# Patient Record
Sex: MALE | Race: WHITE | NOT HISPANIC OR LATINO | Employment: UNEMPLOYED | URBAN - METROPOLITAN AREA
[De-identification: names, ages, dates, MRNs, and addresses within clinical notes are randomized per-mention and may not be internally consistent; named-entity substitution may affect disease eponyms.]

---

## 2022-02-18 ENCOUNTER — HOSPITAL ENCOUNTER (INPATIENT)
Facility: HOSPITAL | Age: 57
LOS: 3 days | Discharge: HOME HEALTH | DRG: 024 | End: 2022-02-21
Attending: EMERGENCY MEDICINE | Admitting: PSYCHIATRY & NEUROLOGY
Payer: COMMERCIAL

## 2022-02-18 DIAGNOSIS — I48.91 ATRIAL FIBRILLATION, UNSPECIFIED TYPE: ICD-10-CM

## 2022-02-18 DIAGNOSIS — I63.411 CEREBROVASCULAR ACCIDENT (CVA) DUE TO EMBOLIC OCCLUSION OF RIGHT MIDDLE CEREBRAL ARTERY: Primary | ICD-10-CM

## 2022-02-18 DIAGNOSIS — I63.9 CVA (CEREBRAL VASCULAR ACCIDENT): ICD-10-CM

## 2022-02-18 DIAGNOSIS — I63.9 STROKE: ICD-10-CM

## 2022-02-18 PROBLEM — I48.0 PAROXYSMAL ATRIAL FIBRILLATION: Status: ACTIVE | Noted: 2022-02-18

## 2022-02-18 PROBLEM — I10 ESSENTIAL HYPERTENSION: Status: ACTIVE | Noted: 2022-02-18

## 2022-02-18 LAB
ALBUMIN SERPL BCP-MCNC: 3.4 G/DL (ref 3.5–5.2)
ALP SERPL-CCNC: 46 U/L (ref 55–135)
ALT SERPL W/O P-5'-P-CCNC: 29 U/L (ref 10–44)
ANION GAP SERPL CALC-SCNC: 10 MMOL/L (ref 8–16)
APTT BLDCRRT: 25.7 SEC (ref 21–32)
AST SERPL-CCNC: 23 U/L (ref 10–40)
BASOPHILS # BLD AUTO: 0.02 K/UL (ref 0–0.2)
BASOPHILS NFR BLD: 0.2 % (ref 0–1.9)
BILIRUB SERPL-MCNC: 0.7 MG/DL (ref 0.1–1)
BUN SERPL-MCNC: 16 MG/DL (ref 6–20)
BUN SERPL-MCNC: 18 MG/DL (ref 6–30)
CALCIUM SERPL-MCNC: 8.7 MG/DL (ref 8.7–10.5)
CHLORIDE SERPL-SCNC: 107 MMOL/L (ref 95–110)
CHLORIDE SERPL-SCNC: 109 MMOL/L (ref 95–110)
CHOLEST SERPL-MCNC: 135 MG/DL (ref 120–199)
CHOLEST/HDLC SERPL: 4.4 {RATIO} (ref 2–5)
CO2 SERPL-SCNC: 22 MMOL/L (ref 23–29)
CREAT SERPL-MCNC: 0.7 MG/DL (ref 0.5–1.4)
CREAT SERPL-MCNC: 0.7 MG/DL (ref 0.5–1.4)
CREAT SERPL-MCNC: 0.8 MG/DL (ref 0.5–1.4)
DIFFERENTIAL METHOD: ABNORMAL
EOSINOPHIL # BLD AUTO: 0.1 K/UL (ref 0–0.5)
EOSINOPHIL NFR BLD: 0.5 % (ref 0–8)
ERYTHROCYTE [DISTWIDTH] IN BLOOD BY AUTOMATED COUNT: 13 % (ref 11.5–14.5)
EST. GFR  (AFRICAN AMERICAN): >60 ML/MIN/1.73 M^2
EST. GFR  (NON AFRICAN AMERICAN): >60 ML/MIN/1.73 M^2
ESTIMATED AVG GLUCOSE: 97 MG/DL (ref 68–131)
GLUCOSE SERPL-MCNC: 89 MG/DL (ref 70–110)
GLUCOSE SERPL-MCNC: 92 MG/DL (ref 70–110)
HBA1C MFR BLD: 5 % (ref 4–5.6)
HCT VFR BLD AUTO: 45.4 % (ref 40–54)
HCT VFR BLD CALC: 43 %PCV (ref 36–54)
HDLC SERPL-MCNC: 31 MG/DL (ref 40–75)
HDLC SERPL: 23 % (ref 20–50)
HGB BLD-MCNC: 15.3 G/DL (ref 14–18)
IMM GRANULOCYTES # BLD AUTO: 0.04 K/UL (ref 0–0.04)
IMM GRANULOCYTES NFR BLD AUTO: 0.4 % (ref 0–0.5)
INR PPP: 1.1 (ref 0.8–1.2)
INR PPP: 1.1 (ref 0.8–1.2)
LDLC SERPL CALC-MCNC: 88.4 MG/DL (ref 63–159)
LYMPHOCYTES # BLD AUTO: 1.8 K/UL (ref 1–4.8)
LYMPHOCYTES NFR BLD: 16.6 % (ref 18–48)
MAGNESIUM SERPL-MCNC: 1.9 MG/DL (ref 1.6–2.6)
MCH RBC QN AUTO: 28.8 PG (ref 27–31)
MCHC RBC AUTO-ENTMCNC: 33.7 G/DL (ref 32–36)
MCV RBC AUTO: 86 FL (ref 82–98)
MONOCYTES # BLD AUTO: 1.1 K/UL (ref 0.3–1)
MONOCYTES NFR BLD: 10.2 % (ref 4–15)
NEUTROPHILS # BLD AUTO: 8 K/UL (ref 1.8–7.7)
NEUTROPHILS NFR BLD: 72.1 % (ref 38–73)
NONHDLC SERPL-MCNC: 104 MG/DL
NRBC BLD-RTO: 0 /100 WBC
PHOSPHATE SERPL-MCNC: 3.7 MG/DL (ref 2.7–4.5)
PLATELET # BLD AUTO: 192 K/UL (ref 150–450)
PMV BLD AUTO: 9.4 FL (ref 9.2–12.9)
POC IONIZED CALCIUM: 1.05 MMOL/L (ref 1.06–1.42)
POC PTINR: 1.2 (ref 0.9–1.2)
POC PTWBT: 14.8 SEC (ref 9.7–14.3)
POC TCO2 (MEASURED): 23 MMOL/L (ref 23–29)
POCT GLUCOSE: 97 MG/DL (ref 70–110)
POTASSIUM BLD-SCNC: 3.3 MMOL/L (ref 3.5–5.1)
POTASSIUM SERPL-SCNC: 3.5 MMOL/L (ref 3.5–5.1)
PROT SERPL-MCNC: 6.6 G/DL (ref 6–8.4)
PROTHROMBIN TIME: 11.5 SEC (ref 9–12.5)
PROTHROMBIN TIME: 11.6 SEC (ref 9–12.5)
RBC # BLD AUTO: 5.31 M/UL (ref 4.6–6.2)
SAMPLE: ABNORMAL
SAMPLE: ABNORMAL
SAMPLE: NORMAL
SODIUM BLD-SCNC: 142 MMOL/L (ref 136–145)
SODIUM SERPL-SCNC: 141 MMOL/L (ref 136–145)
TRIGL SERPL-MCNC: 78 MG/DL (ref 30–150)
TSH SERPL DL<=0.005 MIU/L-ACNC: 1.54 UIU/ML (ref 0.4–4)
WBC # BLD AUTO: 11.11 K/UL (ref 3.9–12.7)

## 2022-02-18 PROCEDURE — 25500020 PHARM REV CODE 255: Performed by: EMERGENCY MEDICINE

## 2022-02-18 PROCEDURE — 85025 COMPLETE CBC W/AUTO DIFF WBC: CPT | Performed by: EMERGENCY MEDICINE

## 2022-02-18 PROCEDURE — 99291 PR CRITICAL CARE, E/M 30-74 MINUTES: ICD-10-PCS | Mod: ,,, | Performed by: EMERGENCY MEDICINE

## 2022-02-18 PROCEDURE — 85610 PROTHROMBIN TIME: CPT | Performed by: EMERGENCY MEDICINE

## 2022-02-18 PROCEDURE — 84443 ASSAY THYROID STIM HORMONE: CPT | Performed by: EMERGENCY MEDICINE

## 2022-02-18 PROCEDURE — 99291 CRITICAL CARE FIRST HOUR: CPT | Mod: 25

## 2022-02-18 PROCEDURE — 99291 CRITICAL CARE FIRST HOUR: CPT | Mod: ,,, | Performed by: EMERGENCY MEDICINE

## 2022-02-18 PROCEDURE — 84100 ASSAY OF PHOSPHORUS: CPT

## 2022-02-18 PROCEDURE — 86901 BLOOD TYPING SEROLOGIC RH(D): CPT

## 2022-02-18 PROCEDURE — 25000003 PHARM REV CODE 250: Performed by: PSYCHIATRY & NEUROLOGY

## 2022-02-18 PROCEDURE — 63600175 PHARM REV CODE 636 W HCPCS: Mod: JG

## 2022-02-18 PROCEDURE — 83735 ASSAY OF MAGNESIUM: CPT

## 2022-02-18 PROCEDURE — 93010 EKG 12-LEAD: ICD-10-PCS | Mod: ,,, | Performed by: INTERNAL MEDICINE

## 2022-02-18 PROCEDURE — 93010 ELECTROCARDIOGRAM REPORT: CPT | Mod: ,,, | Performed by: INTERNAL MEDICINE

## 2022-02-18 PROCEDURE — 37195 THROMBOLYTIC THERAPY STROKE: CPT

## 2022-02-18 PROCEDURE — 85730 THROMBOPLASTIN TIME PARTIAL: CPT

## 2022-02-18 PROCEDURE — 80061 LIPID PANEL: CPT | Performed by: EMERGENCY MEDICINE

## 2022-02-18 PROCEDURE — 99900035 HC TECH TIME PER 15 MIN (STAT)

## 2022-02-18 PROCEDURE — 82565 ASSAY OF CREATININE: CPT

## 2022-02-18 PROCEDURE — 20000000 HC ICU ROOM

## 2022-02-18 PROCEDURE — 80053 COMPREHEN METABOLIC PANEL: CPT | Performed by: EMERGENCY MEDICINE

## 2022-02-18 PROCEDURE — 94761 N-INVAS EAR/PLS OXIMETRY MLT: CPT

## 2022-02-18 PROCEDURE — 85610 PROTHROMBIN TIME: CPT

## 2022-02-18 PROCEDURE — 83036 HEMOGLOBIN GLYCOSYLATED A1C: CPT

## 2022-02-18 PROCEDURE — 25000003 PHARM REV CODE 250

## 2022-02-18 PROCEDURE — 93005 ELECTROCARDIOGRAM TRACING: CPT

## 2022-02-18 PROCEDURE — 85610 PROTHROMBIN TIME: CPT | Mod: 91

## 2022-02-18 RX ORDER — AMOXICILLIN 250 MG
1 CAPSULE ORAL 2 TIMES DAILY
Status: DISCONTINUED | OUTPATIENT
Start: 2022-02-18 | End: 2022-02-21 | Stop reason: HOSPADM

## 2022-02-18 RX ORDER — NICARDIPINE HYDROCHLORIDE 0.2 MG/ML
0-15 INJECTION INTRAVENOUS CONTINUOUS
Status: DISCONTINUED | OUTPATIENT
Start: 2022-02-18 | End: 2022-02-19

## 2022-02-18 RX ORDER — ACETAMINOPHEN 325 MG/1
650 TABLET ORAL EVERY 6 HOURS PRN
Status: DISCONTINUED | OUTPATIENT
Start: 2022-02-18 | End: 2022-02-21 | Stop reason: HOSPADM

## 2022-02-18 RX ORDER — SODIUM CHLORIDE 0.9 % (FLUSH) 0.9 %
10 SYRINGE (ML) INJECTION
Status: DISCONTINUED | OUTPATIENT
Start: 2022-02-18 | End: 2022-02-21 | Stop reason: HOSPADM

## 2022-02-18 RX ORDER — SODIUM CHLORIDE 9 MG/ML
50 INJECTION, SOLUTION INTRAVENOUS ONCE
Status: DISCONTINUED | OUTPATIENT
Start: 2022-02-18 | End: 2022-02-21

## 2022-02-18 RX ORDER — MUPIROCIN 20 MG/G
OINTMENT TOPICAL 2 TIMES DAILY
Status: DISCONTINUED | OUTPATIENT
Start: 2022-02-18 | End: 2022-02-21 | Stop reason: HOSPADM

## 2022-02-18 RX ORDER — ATORVASTATIN CALCIUM 40 MG/1
40 TABLET, FILM COATED ORAL DAILY
Status: DISCONTINUED | OUTPATIENT
Start: 2022-02-19 | End: 2022-02-21 | Stop reason: HOSPADM

## 2022-02-18 RX ORDER — LABETALOL HCL 20 MG/4 ML
10 SYRINGE (ML) INTRAVENOUS
Status: DISCONTINUED | OUTPATIENT
Start: 2022-02-18 | End: 2022-02-21 | Stop reason: HOSPADM

## 2022-02-18 RX ORDER — ONDANSETRON 2 MG/ML
4 INJECTION INTRAMUSCULAR; INTRAVENOUS EVERY 8 HOURS PRN
Status: DISCONTINUED | OUTPATIENT
Start: 2022-02-18 | End: 2022-02-21 | Stop reason: HOSPADM

## 2022-02-18 RX ORDER — HYDRALAZINE HYDROCHLORIDE 20 MG/ML
10 INJECTION INTRAMUSCULAR; INTRAVENOUS EVERY 6 HOURS PRN
Status: DISCONTINUED | OUTPATIENT
Start: 2022-02-18 | End: 2022-02-21 | Stop reason: HOSPADM

## 2022-02-18 RX ADMIN — IOHEXOL 100 ML: 350 INJECTION, SOLUTION INTRAVENOUS at 06:02

## 2022-02-18 RX ADMIN — SENNOSIDES AND DOCUSATE SODIUM 1 TABLET: 50; 8.6 TABLET ORAL at 10:02

## 2022-02-18 RX ADMIN — MUPIROCIN: 20 OINTMENT TOPICAL at 10:02

## 2022-02-18 RX ADMIN — ALTEPLASE 81 MG: KIT at 06:02

## 2022-02-18 NOTE — ED PROVIDER NOTES
Encounter Date: 2/18/2022       History     Chief Complaint   Patient presents with    Extremity Weakness     LKN 1530     56-year-old male presenting with left-sided weakness.    PMH:  Hypertension, diagnosed with COVID-19 3 weeks ago    Context:  The patient's wife is at bedside reports he was last normal around 3:30 p.m. this afternoon, as they were texting.  When his wife returned home, she found him on the couch with slurred speech and not moving his left side.  The patient denies chest pain.  He states he had a headache earlier in the day today.  Onset:  Gradual  Location:  Left side  Duration:  Constant since onset this afternoon  Associated Symptoms:  Denies vomiting or head injury    The history is provided by the patient, medical records, the spouse and the EMS personnel. No  was used.     Review of patient's allergies indicates:  No Known Allergies  No past medical history on file.  No past surgical history on file.  No family history on file.     Review of Systems   Constitutional: Negative for fever.   HENT: Negative for facial swelling.    Eyes: Negative for visual disturbance.   Respiratory: Negative for shortness of breath.    Cardiovascular: Negative for chest pain.   Gastrointestinal: Negative for vomiting.   Skin: Negative for wound.   Allergic/Immunologic: Negative for immunocompromised state.   Neurological: Positive for facial asymmetry, speech difficulty, weakness and headaches.   Hematological: Does not bruise/bleed easily.       Physical Exam     Initial Vitals [02/18/22 1749]   BP Pulse Resp Temp SpO2   (!) 166/90 (!) 113 15 97.5 °F (36.4 °C) 95 %      MAP       --         Physical Exam    Nursing note and vitals reviewed.  Constitutional: He is not diaphoretic. No distress.   HENT:   Head: Normocephalic and atraumatic.   Eyes: Right eye exhibits no discharge. Left eye exhibits no discharge.   Neck: Neck supple. No tracheal deviation present.   Cardiovascular: Normal  rate and regular rhythm.   Pulmonary/Chest: Breath sounds normal. No respiratory distress.   Abdominal: Abdomen is soft. There is no abdominal tenderness.   Musculoskeletal:      Cervical back: Neck supple.     Neurological: He is alert and oriented to person, place, and time.   Slurred speech  Facial asymmetry  Right gaze deviation  Left arm and leg neglect  Full strength and sensation in right upper extremity and right lower extremity   Skin: Skin is warm. No rash noted.   Psychiatric: He has a normal mood and affect. His behavior is normal.         ED Course   Procedures  Labs Reviewed   CBC W/ AUTO DIFFERENTIAL - Abnormal; Notable for the following components:       Result Value    Gran # (ANC) 8.0 (*)     Mono # 1.1 (*)     Lymph % 16.6 (*)     All other components within normal limits   COMPREHENSIVE METABOLIC PANEL - Abnormal; Notable for the following components:    CO2 22 (*)     Albumin 3.4 (*)     Alkaline Phosphatase 46 (*)     All other components within normal limits   LIPID PANEL - Abnormal; Notable for the following components:    HDL 31 (*)     All other components within normal limits   ISTAT PROCEDURE - Abnormal; Notable for the following components:    POC Potassium 3.3 (*)     POC Ionized Calcium 1.05 (*)     All other components within normal limits   ISTAT PROCEDURE - Abnormal; Notable for the following components:    POC PTWBT 14.8 (*)     All other components within normal limits   PROTIME-INR   TSH   POCT GLUCOSE, HAND-HELD DEVICE   TYPE & SCREEN   ISTAT CREATININE   POCT GLUCOSE MONITORING CONTINUOUS     EKG Readings: (Independently Interpreted)   Initial Reading: No STEMI. Rhythm: Atrial Fibrillation. Heart Rate: 110. Clinical Impression: Atrial Fibrillation       Imaging Results           X-Ray Chest AP Single View (Final result)  Result time 02/18/22 21:26:16    Final result by Matt Bradford MD (02/18/22 21:26:16)                 Impression:      Findings suggestive of congestive  heart failure with developing pulmonary edema.    This report was flagged in Epic as abnormal.      Electronically signed by: Matt Bradford  Date:    02/18/2022  Time:    21:26             Narrative:    EXAMINATION:  XR CHEST 1 VIEW    CLINICAL HISTORY:  Admission;    TECHNIQUE:  Single frontal view of the chest was performed.    COMPARISON:  None    FINDINGS:  Multiple views of the chest indicates cardiomegaly with infiltrates involving the right left lung parenchyma.  No indication of a pulmonary nodule or pleural effusion.  No obvious evidence of free air under the diaphragm.                               IR Angiogram Carotid Cerebral Bilateral inc Arch (In process)                CTA STROKE MULTI-PHASE (Final result)  Result time 02/18/22 20:19:12    Final result by Josh Lozano MD (02/18/22 20:19:12)                 Impression:      Large vessel occlusion involving the proximal, mid, and distal aspect of the right M1 segment.  Reconstitution of flow is identified near the M1/M2 bifurcation with fair collateral circulation identified on subsequent phase imaging, likely secondary to communicating arteries and collateral vessels.    Complete occlusion of the right cervical internal carotid artery extending towards the petrous and cavernous segments.  Reconstitution of flow is identified near the carotid terminus, likely secondary to filling from anterior and posterior communicating arteries.    Subtle loss of gray-white differentiation involving the right basal ganglia, insular cortex, and temporal lobe, as well as hyperdense right MCA sign, in keeping with acute infarct involving these regions.  No evidence for acute hemorrhage.    Dominant left vertebral artery, noting diminutive character of the right vertebral artery without evidence for hemodynamically significant stenosis, aneurysmal dilatation, or dissection.    COMMUNICATION  This critical result was discovered/received at 19:20.  The critical information  above was relayed directly by Dr. Leonard by telephone to Jasper ALVAREZ with stroke team on 02/18/2022 at 19:30.    Electronically signed by resident: Von Leonard  Date:    02/18/2022  Time:    19:31    Electronically signed by: Josh Lozano MD  Date:    02/18/2022  Time:    20:19             Narrative:    EXAMINATION:  CTA STROKE MULTI-PHASE    CLINICAL HISTORY:  Neuro deficit, acute, stroke suspected;    TECHNIQUE:  Non contrast low dose axial images were obtained thought the head. CT angiogram was performed from the level of the anya to the top of the head following the IV administration of 100mL of Omnipaque 350.   Sagittal and coronal reconstructions and maximum intensity projection reconstructions were performed. Arterial stenosis percentages are based on NASCET measurement criteria.  Two additional phases of immediate post-contrast CTA images were performed through the head alone.    CT source data was analyzed using artificial intelligence software for detection of a large vessel occlusion (LVO) in order to enable computer-assisted triage notification and to aid clinical stroke decision making Rapid AI was used to detect hemorrhage.    COMPARISON:  No relevant prior imaging for comparison.    FINDINGS:  Intracranial Compartment:    Globes are symmetric, noting symmetric rightward gaze.    Ventricles and sulci are normal in size for age without evidence of hydrocephalus. No extra-axial blood or fluid collections.    On noncontrast imaging, there is tubular hyperdense signal in the expected course of the right MCA (axial series 2, image 16).  Additionally, there is subtle loss of gray-white differentiation involving the right basal ganglia, insular cortex, and temporal lobe compared to the left.    No parenchymal mass, hemorrhage, or edema.    Skull/Extracranial Contents (limited evaluation): No fracture. Mastoid air cells and paranasal sinuses are essentially clear.    Lung apices demonstrate no evidence for  consolidation, pleural effusion, or pneumothorax.    Few, prominent mediastinal lymph nodes, for example a right paratracheal lymph node measuring approximately 1.0 cm (axial series 304, image 50).    Soft tissues of the neck are unremarkable.    Aorta: Normal 3 vessel arch.    Extracranial carotid circulation: There is complete occlusion of the right cervical internal carotid artery just past the carotid bifurcation (axial series 304, image 250).  Filling defect includes the petrous and cavernous portions of the right internal carotid artery.  There is reconstitution of flow at the level of the supraclinoid right internal carotid artery extending towards the carotid terminus (axial series 304, image 411).  Flow at this point is likely secondary to anterior posterior communicating arteries.  Left carotid circulation demonstrates no hemodynamically significant stenosis, aneurysmal dilatation, or dissection.    Extracranial vertebral circulation: Dominant left vertebral artery.  Diminutive character of the right vertebral artery without evidence for hemodynamically significant stenosis, aneurysmal dilatation, or dissection.    Intracranial Arteries: There is a large vessel occlusion involving the proximal right MCA (axial series 305, image 112).  Filling defect extends approximately from the right proximal MCA towards the M1/M2 bifurcation.  Remainder of the right MCA branches fill appropriately, likely from communicating arteries and collateral vessels.  On subsequent phase imaging, there is adequate collateral vasculature in the right MCA distribution.  Left MCA, ACAs, and PCAs are unremarkable without evidence for aneurysm, dissection, critical stenosis, or occlusion.    Venous structures (limited evaluation): Normal.    Mild degenerative change of the visualized spine.  There are emphysematous changes in the apices.                                 Medications   0.9%  NaCl infusion (has no administration in time  range)   niCARdipine 40 mg/200 mL (0.2 mg/mL) infusion (0 mg/hr Intravenous Not Given 2/18/22 1945)   sodium chloride 0.9% flush 10 mL (has no administration in time range)   senna-docusate 8.6-50 mg per tablet 1 tablet (1 tablet Oral Given 2/18/22 2230)   ondansetron injection 4 mg (has no administration in time range)   atorvastatin tablet 40 mg (has no administration in time range)   labetalol 20 mg/4 mL (5 mg/mL) IV syring (has no administration in time range)   hydrALAZINE injection 10 mg (has no administration in time range)   acetaminophen tablet 650 mg (has no administration in time range)   sodium chloride 0.9% flush 10 mL (has no administration in time range)   iohexoL (OMNIPAQUE 350) injection 125 mL (has no administration in time range)   sodium chloride 0.9% flush 10 mL (has no administration in time range)   mupirocin 2 % ointment ( Nasal Given 2/18/22 2231)   iohexoL (OMNIPAQUE 350) injection 100 mL (100 mLs Intravenous Given 2/18/22 1801)   ALTEPLASE IV BOLUS FROM VIAL 9 mg (9 mg Intravenous Bolus from Bag 2/18/22 1831)   alteplase (ACtivase) injection 81 mg (81 mg Intravenous New Bag 2/18/22 1830)     Medical Decision Making:   History:   Old Medical Records: I decided to obtain old medical records.  Initial Assessment:   56-year-old male presenting with left-sided sahil-neglect.  Code stroke activated on arrival.  Protecting airway, following commands, no indication for emergent airway intervention.    Differential Diagnosis:   Including, but not limited to:  TIA/CVA  Thyroid dysfunction  Electrolyte dysfunction  Hypoglycemia  Doubt aortic pathology/intact and equal distal pulses, no chest pain  Independently Interpreted Test(s):   I have ordered and independently interpreted EKG Reading(s) - see prior notes  Clinical Tests:   Lab Tests: Ordered and Reviewed  Radiological Study: Reviewed and Ordered  Medical Tests: Ordered and Reviewed  ED Management:  CTA consistent with LVO involving right M1  segment.  Complete occlusion of the right cervical internal carotid artery.   I discussed the case with Vascular Neurology, who ordered the patient for tPA.  Blood pressure controlled in the ED.  Also found to be in Afib.  Patient taken emergently for thrombectomy.  Other:   I have discussed this case with another health care provider.            Attending Attestation:         Attending Critical Care:   Critical Care Times:   Direct Patient Care (initial evaluation, reassessments, and time considering the case)................................................................14 minutes.   Additional History from reviewing old medical records or taking additional history from the family, EMS, PCP, etc.......................5 minutes.   Ordering, Reviewing, and Interpreting Diagnostic Studies...............................................................................................................6 minutes.   Documentation..................................................................................................................................................................................4 minutes.   Consultation with other Physicians. .................................................................................................................................................6 minutes.   ==============================================================  · Total Critical Care Time - exclusive of procedural time: 35 minutes.  ==============================================================  Critical care was necessary to treat or prevent imminent or life-threatening deterioration of the following conditions: stroke.   Critical care was time spent personally by me on the following activities: obtaining history from patient or relative, examination of patient, review of old charts, ordering lab, x-rays, and/or EKG, ordering and performing treatments and interventions, discussion with consultants and re-evaluation  of patient's conition.   Critical Care Condition: potentially life-threatening                    Clinical Impression:   Final diagnoses:  [I63.9] Stroke  [I48.91] Atrial fibrillation, unspecified type (Primary)          ED Disposition Condition    Admit               Andrea Cardoza MD  02/18/22 6906

## 2022-02-19 LAB
ABO + RH BLD: NORMAL
ALBUMIN SERPL BCP-MCNC: 3.6 G/DL (ref 3.5–5.2)
ALP SERPL-CCNC: 51 U/L (ref 55–135)
ALT SERPL W/O P-5'-P-CCNC: 25 U/L (ref 10–44)
ANION GAP SERPL CALC-SCNC: 10 MMOL/L (ref 8–16)
ASCENDING AORTA: 3.12 CM
AST SERPL-CCNC: 20 U/L (ref 10–40)
AV INDEX (PROSTH): 0.88
AV MEAN GRADIENT: 2 MMHG
AV PEAK GRADIENT: 4 MMHG
AV VALVE AREA: 3.2 CM2
AV VELOCITY RATIO: 0.89
BASOPHILS # BLD AUTO: 0.03 K/UL (ref 0–0.2)
BASOPHILS NFR BLD: 0.3 % (ref 0–1.9)
BILIRUB SERPL-MCNC: 0.9 MG/DL (ref 0.1–1)
BLD GP AB SCN CELLS X3 SERPL QL: NORMAL
BSA FOR ECHO PROCEDURE: 2.36 M2
BUN SERPL-MCNC: 14 MG/DL (ref 6–20)
CALCIUM SERPL-MCNC: 9.2 MG/DL (ref 8.7–10.5)
CHLORIDE SERPL-SCNC: 102 MMOL/L (ref 95–110)
CO2 SERPL-SCNC: 25 MMOL/L (ref 23–29)
CREAT SERPL-MCNC: 0.7 MG/DL (ref 0.5–1.4)
CV ECHO LV RWT: 0.38 CM
DIFFERENTIAL METHOD: ABNORMAL
DOP CALC AO PEAK VEL: 0.94 M/S
DOP CALC AO VTI: 16.32 CM
DOP CALC LVOT AREA: 3.6 CM2
DOP CALC LVOT DIAMETER: 2.15 CM
DOP CALC LVOT PEAK VEL: 0.84 M/S
DOP CALC LVOT STROKE VOLUME: 52.25 CM3
DOP CALCLVOT PEAK VEL VTI: 14.4 CM
E/E' RATIO: 8.26 M/S
ECHO LV POSTERIOR WALL: 0.9 CM (ref 0.6–1.1)
EJECTION FRACTION: 55 %
EOSINOPHIL # BLD AUTO: 0 K/UL (ref 0–0.5)
EOSINOPHIL NFR BLD: 0.3 % (ref 0–8)
ERYTHROCYTE [DISTWIDTH] IN BLOOD BY AUTOMATED COUNT: 13.2 % (ref 11.5–14.5)
EST. GFR  (AFRICAN AMERICAN): >60 ML/MIN/1.73 M^2
EST. GFR  (NON AFRICAN AMERICAN): >60 ML/MIN/1.73 M^2
FRACTIONAL SHORTENING: 36 % (ref 28–44)
GLUCOSE SERPL-MCNC: 92 MG/DL (ref 70–110)
HCT VFR BLD AUTO: 46.1 % (ref 40–54)
HGB BLD-MCNC: 15.5 G/DL (ref 14–18)
IMM GRANULOCYTES # BLD AUTO: 0.02 K/UL (ref 0–0.04)
IMM GRANULOCYTES NFR BLD AUTO: 0.2 % (ref 0–0.5)
INTERVENTRICULAR SEPTUM: 0.94 CM (ref 0.6–1.1)
IVRT: 105.61 MSEC
LA MAJOR: 6.64 CM
LA MINOR: 7.03 CM
LA WIDTH: 3.74 CM
LEFT ATRIUM SIZE: 4.47 CM
LEFT ATRIUM VOLUME INDEX MOD: 48.2 ML/M2
LEFT ATRIUM VOLUME INDEX: 42.6 ML/M2
LEFT ATRIUM VOLUME MOD: 110 CM3
LEFT ATRIUM VOLUME: 97.05 CM3
LEFT INTERNAL DIMENSION IN SYSTOLE: 3.06 CM (ref 2.1–4)
LEFT VENTRICLE DIASTOLIC VOLUME INDEX: 46.36 ML/M2
LEFT VENTRICLE DIASTOLIC VOLUME: 105.7 ML
LEFT VENTRICLE MASS INDEX: 66 G/M2
LEFT VENTRICLE SYSTOLIC VOLUME INDEX: 16.1 ML/M2
LEFT VENTRICLE SYSTOLIC VOLUME: 36.66 ML
LEFT VENTRICULAR INTERNAL DIMENSION IN DIASTOLE: 4.77 CM (ref 3.5–6)
LEFT VENTRICULAR MASS: 150.59 G
LV LATERAL E/E' RATIO: 8.64 M/S
LV SEPTAL E/E' RATIO: 7.92 M/S
LYMPHOCYTES # BLD AUTO: 1.7 K/UL (ref 1–4.8)
LYMPHOCYTES NFR BLD: 17.8 % (ref 18–48)
MAGNESIUM SERPL-MCNC: 1.9 MG/DL (ref 1.6–2.6)
MCH RBC QN AUTO: 29.1 PG (ref 27–31)
MCHC RBC AUTO-ENTMCNC: 33.6 G/DL (ref 32–36)
MCV RBC AUTO: 87 FL (ref 82–98)
MONOCYTES # BLD AUTO: 1.1 K/UL (ref 0.3–1)
MONOCYTES NFR BLD: 10.9 % (ref 4–15)
MV PEAK E VEL: 0.95 M/S
NEUTROPHILS # BLD AUTO: 6.8 K/UL (ref 1.8–7.7)
NEUTROPHILS NFR BLD: 70.5 % (ref 38–73)
NRBC BLD-RTO: 0 /100 WBC
PHOSPHATE SERPL-MCNC: 4 MG/DL (ref 2.7–4.5)
PLATELET # BLD AUTO: 183 K/UL (ref 150–450)
PMV BLD AUTO: 9.4 FL (ref 9.2–12.9)
POCT GLUCOSE: 83 MG/DL (ref 70–110)
POCT GLUCOSE: 85 MG/DL (ref 70–110)
POCT GLUCOSE: 87 MG/DL (ref 70–110)
POCT GLUCOSE: 91 MG/DL (ref 70–110)
POTASSIUM SERPL-SCNC: 3.9 MMOL/L (ref 3.5–5.1)
PROT SERPL-MCNC: 6.7 G/DL (ref 6–8.4)
RA MAJOR: 5.04 CM
RA PRESSURE: 3 MMHG
RA WIDTH: 3.38 CM
RBC # BLD AUTO: 5.33 M/UL (ref 4.6–6.2)
RIGHT VENTRICULAR END-DIASTOLIC DIMENSION: 3.2 CM
RV TISSUE DOPPLER FREE WALL SYSTOLIC VELOCITY 1 (APICAL 4 CHAMBER VIEW): 13.1 CM/S
SINUS: 3.05 CM
SODIUM SERPL-SCNC: 137 MMOL/L (ref 136–145)
STJ: 2.55 CM
TDI LATERAL: 0.11 M/S
TDI SEPTAL: 0.12 M/S
TDI: 0.12 M/S
TRICUSPID ANNULAR PLANE SYSTOLIC EXCURSION: 1.94 CM
WBC # BLD AUTO: 9.67 K/UL (ref 3.9–12.7)

## 2022-02-19 PROCEDURE — 83735 ASSAY OF MAGNESIUM: CPT

## 2022-02-19 PROCEDURE — 84100 ASSAY OF PHOSPHORUS: CPT

## 2022-02-19 PROCEDURE — 25000003 PHARM REV CODE 250: Performed by: PSYCHIATRY & NEUROLOGY

## 2022-02-19 PROCEDURE — 99223 PR INITIAL HOSPITAL CARE,LEVL III: ICD-10-PCS | Mod: ,,, | Performed by: PSYCHIATRY & NEUROLOGY

## 2022-02-19 PROCEDURE — 99233 PR SUBSEQUENT HOSPITAL CARE,LEVL III: ICD-10-PCS | Mod: ,,, | Performed by: PSYCHIATRY & NEUROLOGY

## 2022-02-19 PROCEDURE — 97161 PT EVAL LOW COMPLEX 20 MIN: CPT

## 2022-02-19 PROCEDURE — 25000003 PHARM REV CODE 250: Performed by: NURSE PRACTITIONER

## 2022-02-19 PROCEDURE — 99223 1ST HOSP IP/OBS HIGH 75: CPT | Mod: ,,, | Performed by: PSYCHIATRY & NEUROLOGY

## 2022-02-19 PROCEDURE — 80053 COMPREHEN METABOLIC PANEL: CPT

## 2022-02-19 PROCEDURE — 99233 SBSQ HOSP IP/OBS HIGH 50: CPT | Mod: ,,, | Performed by: PSYCHIATRY & NEUROLOGY

## 2022-02-19 PROCEDURE — 85025 COMPLETE CBC W/AUTO DIFF WBC: CPT

## 2022-02-19 PROCEDURE — 94761 N-INVAS EAR/PLS OXIMETRY MLT: CPT

## 2022-02-19 PROCEDURE — 92610 EVALUATE SWALLOWING FUNCTION: CPT

## 2022-02-19 PROCEDURE — 25000003 PHARM REV CODE 250

## 2022-02-19 PROCEDURE — 97535 SELF CARE MNGMENT TRAINING: CPT

## 2022-02-19 PROCEDURE — 63600175 PHARM REV CODE 636 W HCPCS: Performed by: NURSE PRACTITIONER

## 2022-02-19 PROCEDURE — 20000000 HC ICU ROOM

## 2022-02-19 PROCEDURE — 97165 OT EVAL LOW COMPLEX 30 MIN: CPT

## 2022-02-19 PROCEDURE — 97530 THERAPEUTIC ACTIVITIES: CPT

## 2022-02-19 RX ORDER — HEPARIN SODIUM 5000 [USP'U]/ML
5000 INJECTION, SOLUTION INTRAVENOUS; SUBCUTANEOUS EVERY 8 HOURS
Status: DISCONTINUED | OUTPATIENT
Start: 2022-02-19 | End: 2022-02-19

## 2022-02-19 RX ORDER — HEPARIN SODIUM 5000 [USP'U]/ML
5000 INJECTION, SOLUTION INTRAVENOUS; SUBCUTANEOUS EVERY 8 HOURS
Status: COMPLETED | OUTPATIENT
Start: 2022-02-19 | End: 2022-02-20

## 2022-02-19 RX ORDER — NAPROXEN SODIUM 220 MG/1
81 TABLET, FILM COATED ORAL DAILY
Status: DISCONTINUED | OUTPATIENT
Start: 2022-02-19 | End: 2022-02-21 | Stop reason: HOSPADM

## 2022-02-19 RX ADMIN — ATORVASTATIN CALCIUM 40 MG: 40 TABLET, FILM COATED ORAL at 08:02

## 2022-02-19 RX ADMIN — MUPIROCIN: 20 OINTMENT TOPICAL at 08:02

## 2022-02-19 RX ADMIN — ASPIRIN 81 MG CHEWABLE TABLET 81 MG: 81 TABLET CHEWABLE at 09:02

## 2022-02-19 RX ADMIN — SENNOSIDES AND DOCUSATE SODIUM 1 TABLET: 50; 8.6 TABLET ORAL at 08:02

## 2022-02-19 RX ADMIN — HEPARIN SODIUM 5000 UNITS: 5000 INJECTION INTRAVENOUS; SUBCUTANEOUS at 08:02

## 2022-02-19 NOTE — ED TRIAGE NOTES
Pt presents to ER from home for neurological evaluation. Pt's LKW at 1530 today. Pt then developed left sided weakness, slurred speech, and left facial droop.

## 2022-02-19 NOTE — CONSULTS
[facility] - Emergency Dept  Vascular Neurology  [facility]  Consult Note    Inpatient consult to Vascular (Stroke) Neurology  Consult performed by: Brad Hutchinson MD  Consult ordered by: Roopa Meza PA-C    Inpatient consult to Vascular (Stroke) Neurology  Consult performed by: Brad Hutchinson MD  Consult ordered by: Andrea Cardoza MD        Assessment/Plan:     Patient is a 56 y.o. year old male with:    * Stroke due to embolism of right middle cerebral artery  Andry Cantu is a 56 y.o. male who presented to ED with LSW, L facial droop, and dysarthria. Stroke code called on ED arrival. Patient LKN at 1530. CT head with hyperdense R MCA vessel sign and no early ischemic changes. Patient was treated with alteplase. CTA multiphase with tandem R carotid and  R M1 occlusions. Patient going to IR for possible thrombectomy. Patient to be admitted to [facility] following IR.     Stroke etiology likely cardioembolic 2/2 atrial fibrillation    Antithrombotics for secondary stroke prevention: Antiplatelets: None: Hold all Antithrombotics x 24 hours after IV t-PA administration    Statins for secondary stroke prevention and hyperlipidemia, if present:   Statins: Atorvastatin- 40 mg daily    Aggressive risk factor modification: None     Rehab efforts: The patient has been evaluated by a stroke team provider and the therapy needs have been fully considered based off the presenting complaints and exam findings. The following therapy evaluations are needed: PT evaluate and treat, OT evaluate and treat, SLP evaluate and treat, PM&R evaluate for appropriate placement    Diagnostics ordered/pending: HgbA1C to assess blood glucose levels, Lipid Profile to assess cholesterol levels, MRI head without contrast to assess brain parenchyma, TTE to assess cardiac function/status , TSH to assess thyroid function    VTE prophylaxis: Mechanical prophylaxis: Place SCDs  None: Reason for No Pharmacological VTE Prophylaxis:  Holding x 24 hours s/p treatment with alteplase (tPA)    BP parameters: Infarct: Post tPA, SBP <180        Essential hypertension  Noted per chart review.    Paroxysmal atrial fibrillation  Stroke risk factor. Noted on telemetry in ED. No prior history.      STROKE DOCUMENTATION     Acute Stroke Times   Last Known Normal Date: 02/18/22  Last Known Normal Time: 1521  Symptom Onset Date: 02/18/22  Symptom Onset Time: 1530  Stroke Team Called Date: 02/18/22  Stroke Team Called Time: 1757  Stroke Team Arrival Date: 02/18/22  Stroke Team Arrival Time: 1800  CT Interpretation Time: 1813  Alteplase Recommended: Yes  CTA Interpretation Time: 1820  Thrombectomy Recommended: Yes  Decision to Treat Time for Alteplase: 1823  Decision to Treat Time for IR: 1847    NIH Scale:  Interval: initial 15 minute assessment from arrival  1a. Level of Consciousness: 1-->Not alert, but arousable by minor stimulation to obey, answer, or respond  1b. LOC Questions: 0-->Answers both questions correctly  1c. LOC Commands: 1-->Performs one task correctly  2. Best Gaze: 2-->Forced deviation, or total gaze paresis not overcome by the oculocephalic maneuver  3. Visual: 0-->No visual loss  4. Facial Palsy: 2-->Partial paralysis (total or near-total paralysis of lower face)  5a. Motor Arm, Left: 3-->No effort against gravity, limb falls  5b. Motor Arm, Right: 0-->No drift, limb holds 90 (or 45) degrees for full 10 secs  6a. Motor Leg, Left: 3-->No effort against gravity, leg falls to bed immediately  6b. Motor Leg, Right: 0-->No drift, leg holds 30 degree position for full 5 secs  7. Limb Ataxia: 1-->Present in one limb  8. Sensory: 1-->Mild-to-moderate sensory loss, patient feels pinprick is less sharp or is dull on the affected side, or there is a loss of superficial pain with pinprick, but patient is aware of being touched  9. Best Language: 1-->Mild-to-moderate aphasia, some obvious loss of fluency or facility of comprehension, without  significant limitation on ideas expressed or form of expression. Reduction of speech and/or comprehension, however, makes conversation. . . (see row details)  10. Dysarthria: 1-->Mild-to-moderate dysarthria, patient slurs at least some words and, at worst, can be understood with some difficulty  11. Extinction and Inattention (formerly Neglect): 1-->Visual, tactile, auditory, spatial, or personal inattention or extinction to bilateral simultaneous stimulation in one of the sensory modalities  Total (NIH Stroke Scale): 17    Modified Gilliam Score: 0  Denisse Coma Scale:14   ABCD2 Score:    [id number] Score:3  HAS -BLED Score:   ICH Score:   Hunt & Tsai Classification:       Thrombolysis Candidate? Yes. The risks and benefits of tPA were discussed with the patient and/or family. The patient and/or family verbalized understanding of the risks arid benefits and has given verbal consent for tPA, If patient was not competent or no family was available, treatment will be administered as an emergency procedure and in what we believe to be the patients best interest    Delays to Thrombolysis?  No    Interventional Revascularization Candidate?   Is the patient eligible for mechanical endovascular reperfusion (GIOVANA)?  Yes    Delays to Thrombectomy? No    Hemorrhagic change of an Ischemic Stroke: Does this patient have an ischemic stroke with hemorrhagic changes? No     Subjective:     History of Present Illness:  Andry Cantu is a 56 y.o. male with PMHx of HTN and new onset Afib (on telemetry) who presented to ED from home with LSW, dysarthria, and L facial droop. History obtained via patients wife and EMR.    Last known normal 02/18/2022 at 15:30. Wife phoned patient at 15:21 while driving home and noted the patient was speaking normally over telephone. When she arrived at 15:30 she notes the patient was dysarthric and not moving his extremities on the left. Wife phoned EMS and patient was transferred to [facility] ED. On transfer  patient was hypertensive (160/90s) with blood glucose in the 90's. On arrival stroke code was activated and patient was rushed to CT. CTA stroke multiphase notable for hyperdense right MCA and right MCA M1 occlusion. Of note, patient recently diagnosed with COVID 3 weeks prior.           No past medical history on file.  No past surgical history on file.  No family history on file.     Review of patient's allergies indicates:  No Known Allergies    Medications: I have reviewed the current medication administration record.    (Not in a hospital admission)      Review of Systems   Constitutional: Negative for chills and fever.   HENT: Negative for drooling.    Eyes: Negative for visual disturbance.   Respiratory: Negative for cough.    Cardiovascular: Negative for chest pain.   Gastrointestinal: Negative for nausea and vomiting.   Genitourinary: Negative for dysuria.   Musculoskeletal: Negative for arthralgias, back pain, myalgias and neck pain.   Skin: Negative for rash.   Neurological: Positive for facial asymmetry, speech difficulty and weakness. Negative for numbness.   Psychiatric/Behavioral: Negative for confusion.     Objective:     Vital Signs (Most Recent):  Temp: 97.5 °F (36.4 °C) (02/18/22 1749)  Pulse: (!) 113 (02/18/22 1749)  Resp: 15 (02/18/22 1749)  BP: (!) 166/90 (02/18/22 1749)  SpO2: 95 % (02/18/22 1749)    Vital Signs Range (Last 24H):  Temp:  [97.5 °F (36.4 °C)]   Pulse:  [113]   Resp:  [15]   BP: (166)/(90)   SpO2:  [95 %]     Physical Exam  Vitals reviewed.   Constitutional:       General: He is not in acute distress.     Appearance: He is well-developed and well-nourished.   HENT:      Head: Normocephalic and atraumatic.      Nose: Nose normal.   Eyes:      General: No scleral icterus.  Cardiovascular:      Rate and Rhythm: Normal rate.   Pulmonary:      Effort: Pulmonary effort is normal. No respiratory distress.   Abdominal:      General: There is no distension.   Musculoskeletal:       Cervical back: Normal range of motion.      Right lower leg: No edema.      Left lower leg: No edema.   Skin:     General: Skin is warm and dry.   Neurological:      Mental Status: He is alert.          Neurological Exam:   LOC: drowsy  Attention Span: poor  Language: No aphasia  Articulation: Dysarthria  Orientation: Not oriented to place, Not oriented to time  Visual Fields: Visual neglect  EOM (CN III, IV, VI): Gaze preference  right  Pupils (CN II, III): PERRL  Facial Sensation (CN V): Normal  Facial Movement (CN VII): Lower facial weakness on the Left  Motor: Arm left  Plegia 0/5  Leg left  Plegia 0/5  Arm right  Normal 5/5  Leg right Normal 5/5  Sensation: Edy-hypoesthesia left      Laboratory:  CMP:   Recent Labs   Lab 02/18/22  1820   CALCIUM 8.7   ALBUMIN 3.4*   PROT 6.6      K 3.5   CO2 22*      BUN 16   CREATININE 0.7   ALKPHOS 46*   ALT 29   AST 23   BILITOT 0.7     CBC:   Recent Labs   Lab 02/18/22  1755   HCT 43     Lipid Panel:   Recent Labs   Lab 02/18/22  1820   CHOL 135   LDLCALC 88.4   HDL 31*   TRIG 78     Coagulation:   Recent Labs   Lab 02/18/22  1820   INR 1.1     Hgb A1C: No results for input(s): HGBA1C in the last 168 hours.  TSH:   Recent Labs   Lab 02/18/22  1820   TSH 1.540       Diagnostic Results:      Brain imaging:  MRI pending    Vessel Imaging:  CTA Multiphase. Date: 02/18/22  No hemorrhage  No early ischemic changes  R carotid/R M1 tandem occlusion    Cardiac Evaluation:   TTE pending      Brad Hutchinson MD  [facility]  Department of Vascular Neurology   [facility] - Emergency Dept

## 2022-02-19 NOTE — NURSING
[facility] Care Plan    POC reviewed with Andry Cantu and family at 0300. Pt verbalized understanding. Questions and concerns addressed. No acute events overnight. Pt progressing toward goals. Will continue to monitor. See below and flowsheets for full assessment and VS info.       Neuro:  Denisse Coma Scale  Best Eye Response: 3-->(E3) to speech  Best Motor Response: 6-->(M6) obeys commands  Best Verbal Response: 4-->(V4) confused  Denisse Coma Scale Score: 13        24hr Temp:  [97.5 °F (36.4 °C)-97.9 °F (36.6 °C)]     CV:   Rhythm: normal sinus rhythm  BP goals:   SBP < 160  MAP > 65    Resp:   O2 Device (Oxygen Therapy): room air       Plan: Monitor for neuro changes and bleeding post tpa/thrombectomy    GI/:     Diet/Nutrition Received: NPO  Last Bowel Movement: 02/18/22       Intake/Output Summary (Last 24 hours) at 2/19/2022 0633  Last data filed at 2/19/2022 0500  Gross per 24 hour   Intake 460 ml   Output 700 ml   Net -240 ml          Labs/Accuchecks:  Recent Labs   Lab 02/19/22  0016   WBC 9.67   RBC 5.33   HGB 15.5   HCT 46.1         Recent Labs   Lab 02/19/22  0016      K 3.9   CO2 25      BUN 14   CREATININE 0.7   ALKPHOS 51*   ALT 25   AST 20   BILITOT 0.9      Recent Labs   Lab 02/18/22  2238   INR 1.1   APTT 25.7    No results for input(s): CPK, CPKMB, TROPONINI, MB in the last 168 hours.    Electrolytes: N/A - electrolytes WDL  Accuchecks: Q6H    Gtts:   nicardipine         LDA/Wounds:  Lines/Drains/Airways     Peripheral Intravenous Line  Duration                Peripheral IV - Single Lumen 02/18/22 1800 18 G Right Antecubital <1 day         Peripheral IV - Single Lumen 02/19/22 0000 Left;Posterior Hand <1 day              Wounds: No  Wound care consulted: No

## 2022-02-19 NOTE — ASSESSMENT & PLAN NOTE
56M, MYRON on 2/18/22 at 3:30 PM, presented with R MCA stroke. Now s/p thrombectomy with TICI 2c after 2 passes. Presenting NIH 13.    - Admit to NCC  - Q1h neuro checks  - SBP < 160  - CBC, CMP  - SLP, PT, OT eval  - TSH, A1c  - TTE  No large artery stenosis on CTA

## 2022-02-19 NOTE — PLAN OF CARE
Problem: SLP Goal  Goal: SLP Goal  Description: Speech Pathology Goals  To be met by 2/5/22    1. Pt will exhibit a functional swallow for tolerance of a regular diet with thin liquids in order to maintain adequate hydration and nutrition  2. Pt will undergo formal speech and language evaluation to determine presence/severity of speech, language, and/or cognitive linguistic impairment         Outcome: Ongoing, Progressing   Clinical swallow evaluation completed. Recommend a regular diet with thin liquids and meds whole 1 at a time. Full speech/language assessment to be completed in the future. SLP to follow.

## 2022-02-19 NOTE — HPI
Andry Cantu is a 56 y.o. male with PMHx of HTN and new onset Afib (on telemetry) who presented to ED from home with LSW, dysarthria, and L facial droop. History obtained via patients wife and EMR.    Last known normal 02/18/2022 at 15:30. Wife phoned patient at 15:21 while driving home and noted the patient was speaking normally over telephone. When she arrived at 15:30 she notes the patient was dysarthric and not moving his extremities on the left. Wife phoned EMS and [place] [facility] [place]. On transfer patient was hypertensive (160/90s) with blood glucose in the 90's. On arrival stroke code was activated and patient was rushed to CT. CTA stroke multiphase notable for hyperdense right MCA and right MCA M1 occlusion. Of note, patient recently diagnosed with COVID 3 weeks prior.

## 2022-02-19 NOTE — ASSESSMENT & PLAN NOTE
Andry Cantu is a 56 y.o. male who presented to ED with LSW, L facial droop, and dysarthria. CT head with hyperdense R MCA vessel sign and no early ischemic changes. Patient was treated with alteplase. CTA multiphase with tandem R carotid and  R M1 occlusions. Patient received tpa and then went for thrombectomy, TICI 2b. Patient was admitted to [facility]. TTE showed 55% EF, severe LAE, and normal function. Patient was noted to be in AF in ED and remains in AF in NCC. MRI pending. Therapy recommendations pending.     Etiology: Cardioembolic (new onset A Fib)     Antithrombotics for secondary stroke prevention: Antiplatelets: None: Hold all Antithrombotics x 24 hours after IV t-PA administration ---Will need AC in the future, pending MRI to determine when to start AC       Statins for secondary stroke prevention and hyperlipidemia, if present:   Statins: Atorvastatin- 40 mg daily    Aggressive risk factor modification: None     Rehab efforts: The patient has been evaluated by a stroke team provider and the therapy needs have been fully considered based off the presenting complaints and exam findings. The following therapy evaluations are needed: PT evaluate and treat, OT evaluate and treat, SLP evaluate and treat, PM&R evaluate for appropriate placement--pending recommendations; Regular/Thin diet    Diagnostics ordered/pending: MRI head without contrast to assess brain parenchyma    VTE prophylaxis: Mechanical prophylaxis: Place SCDs  None: Reason for No Pharmacological VTE Prophylaxis: Holding x 24 hours s/p treatment with alteplase (tPA)--will need heparin 5000 units q8h for VTE prophylaxis when appropriate    BP parameters: Infarct: Post sucessful thrombectomy, SBP <140

## 2022-02-19 NOTE — PROCEDURES
Radiology Post-Procedure Note    Pre Op Diagnosis: Stroke    Post Op Diagnosis: Stroke    Procedure: Cerebral angiogram and aspiration thrombectomy    Procedure performed by: Shawn Coley MD    Written Informed Consent Obtained: Yes    Specimen Removed: NO    Estimated Blood Loss: less than 50     Procedure report:     An 8F sheath was placed into the right femoral artery and a 5F Jimenez catheter was advanced into the aortic arch.  The right CCA, ICA and MCA were subselected and angiography of the brain was performed after injection into each of these vessels.    Preliminary interpretation: Initial angiogram showed right cervical ICA occlusion. Aspiration was performed in the cervical ICA with large amount of thrombus aspirated. There was not evidence of an underlying stenosis. There was mild spasm around the catheter which improved on subsequent angiograms. Following aspiration there was restoration of flow in the ICA and TICI 2 B flow in the right MCA territory on the first pass.. There was a distal M2 occlusion which was aspirated on the second pass with 2C flow. Please see Imaging report for full details.    A right femoral artery angiogram was performed, the sheath removed and hemostasis achieved using perclose device.  No hematoma was present at the time of hemostasis.    Systolic blood pressure goals less than 160.    The patient tolerated the procedure well.     Shawn Coley MD  Department of Radiology  Pager: [phone]

## 2022-02-19 NOTE — PT/OT/SLP EVAL
"Physical Therapy Co-Evaluation and Co-Treatment    Patient Name:  Andry Cantu   MRN:  [id number]    Recommendations:     Discharge Recommendations:  home health PT   Discharge Equipment Recommendations: none   Barriers to discharge: None and some unsteadiness at this time    Assessment:     Andry Cantu is a 56 y.o. male admitted with a medical diagnosis of Stroke due to embolism of right middle cerebral artery.  He presents with the following impairments/functional limitations:  impaired endurance, impaired self care skills, impaired functional mobilty, gait instability, weakness, impaired balance, decreased lower extremity function. These deficits affect their roles and responsibilities in which they were able to complete prior to admit. Andry Cantu would benefit from acute PT intervention to improve quality of life and focus on recovery of impairments. Once medically stable, recommending pt discharge to Home Health PT.     Rehab Prognosis: Good; patient would benefit from acute skilled PT services 3 x/week to address these deficits and reach maximum level of function. Patient is most appropriate to go to home health PT.  Recent Surgery: * No surgery found *      Plan:     During this hospitalization, patient to be seen 3 x/week to address the identified rehab impairments via gait training, therapeutic exercises, therapeutic activities, neuromuscular re-education and progress toward the following goals:    · Plan of Care Expires:  03/21/22    Subjective     Chief Complaint: none verbalized  Patient/Family Comments/Goals: pt reports he feels "good"  Pain/Comfort:  · Pain Rating 1: 0/10  · Pain Rating Post-Intervention 1: 0/10    Patients cultural, spiritual, Sikh conflicts given the current situation: no    Living Environment/Occupational Profile:  Living Environment: Pt lives with wife in a [facility] with 2 NADEEM without HRs as well as a ramp entry to the back door. Pt's bathroom has a WIS.  Prior Level of Function: " Patient reports being independent with mobility & with ADLs.   Patient uses DME as follows: none.   DME owned (not currently used): none.  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   Drive: yes.   Managing Medicines/Managing Home: yes.   Equipment Used at Home:  none     Patient reports they will have assistance from wife (who works) upon discharge.    Objective:     Communicated with nursing prior to session.  Patient found HOB elevated with blood pressure cuff, peripheral IV, pulse ox (continuous), telemetry, SCD upon PT entry to room.    General Precautions: Standard, fall, aspiration   Orthopedic Precautions:N/A   Braces: N/A    Exams:  · Cognitive Exam: Patient is oriented to Person, Place, Time, Situation  · RLE ROM: WFL  · RLE Strength: WFL, grossly 4 to 5/5  · LLE ROM: WFL  · LLE Strength: WFL, grossly 4 to 5/5  · Sensation: Intact light touch to BLEs, denies numbness/tingling  · Coordination: Heel to shin WFL bilaterally     Functional Mobility:  · Bed Mobility:     · Scooting: stand by assistance  · Supine to Sit: stand by assistance  · Sit to Supine: stand by assistance  · Transfers:     · Sit to Stand:  contact guard assistance with no AD  · Gait: 15 ft x 2 trials, CGA-SBA, no AD,  standing break to urinate with OT assistance in standing, 1 minor LOB during standing  · Stiff and slightly unsteady gait noted  · Balance:   · Static Sitting: SBA  · Dynamic Sitting:  SBA  · Static Standing: SBA-CGA, 1 LOB while standing an urinating with OT assistance  · Dynamic Standing: SBA-CGA    Therapeutic Activities and Exercises:  Patient educated on role of acute care PT and PT POC, safety while in hospital including calling nurse for mobility and call light usage  Whiteboard updated,   Patient clear to ambulate to/from bathroom with RN/PCT, assist x1    AM-PAC 6 CLICK MOBILITY  Turning over in bed (including adjusting bedclothes, sheets and blankets)?: 4  Sitting down on and  standing up from a chair with arms (e.g., wheelchair, bedside commode, etc.): 3  Moving from lying on back to sitting on the side of the bed?: 3  Moving to and from a bed to a chair (including a wheelchair)?: 3  Need to walk in hospital room?: 3  Climbing 3-5 steps with a railing?: 3  Basic Mobility Total Score: 19     Patient left HOB elevated with all lines intact, call button in reach, RN notified, bed alarm on and spouse and other guest present.    GOALS:   Multidisciplinary Problems     Physical Therapy Goals        Problem: Physical Therapy Goal    Goal Priority Disciplines Outcome Goal Variances Interventions   Physical Therapy Goal     PT, PT/OT Ongoing, Progressing     Description: Goals to be met by: 3/5/2022     Patient will increase functional independence with mobility by performin. Supine to sit with [place]  2. Sit to supine with [place]  3. Sit to stand transfer with Supervision  4. Gait  x 200 feet with Supervision using No Assistive Device.                      History:     Past Medical History:   Diagnosis Date    Essential hypertension 2022    Paroxysmal atrial fibrillation 2022       No past surgical history on file.    Time Tracking:     PT Received On: 22  PT Start Time: 1037     PT Stop Time: 1057  PT Total Time (min): 20 min     Billable Minutes: Evaluation 10 Therapeutic Activity 10    2022    Co-evaluation and co-treatment performed for this visit due to suspected patient need for two skilled therapists to ensure patient and staff safety and to accommodate for patient activity tolerance/pain management

## 2022-02-19 NOTE — SUBJECTIVE & OBJECTIVE
Interval History:      ROS:  All systems reviewed and are negative  Objective:     Vitals:  Temp: 98 °F (36.7 °C)  Pulse: 91  Rhythm: normal sinus rhythm  BP: (!) 147/86  MAP (mmHg): 110  Resp: (!) 22  SpO2: 96 %  O2 Device (Oxygen Therapy): room air    Temp  Min: 97.5 °F (36.4 °C)  Max: 98 °F (36.7 °C)  Pulse  Min: 80  Max: 113  BP  Min: 115/84  Max: 168/98  MAP (mmHg)  Min: 84  Max: 114  Resp  Min: 10  Max: 30  ETCO2 (mmHg)  Min: 16 mmHg  Max: 22 mmHg  SpO2  Min: 94 %  Max: 100 %    02/18 0701 - 02/19 0700  In: 520 [P.O.:520]  Out: 700 [Urine:700]           Physical Exam  General: no acute distress, alert  Head: normocephalic  Eyes: XI,EOMI  Lungs:CTA  Heart: regular rate and rhthym  Abd: soft, NT  No swelling,edema  No facial asymmetry or VF abnormality  Symmetric motor exam  No dysarthria or aphasia    Medications:  Continuous Scheduledsodium chloride 0.9%, 50 mL, Once  aspirin, 81 mg, Daily  atorvastatin, 40 mg, Daily  heparin (porcine), 5,000 Units, Q8H  mupirocin, , BID  senna-docusate 8.6-50 mg, 1 tablet, BID    PRNacetaminophen, 650 mg, Q6H PRN  hydrALAZINE, 10 mg, Q6H PRN  iohexoL, 125 mL, ONCE PRN  labetalol, 10 mg, Q15 Min PRN  ondansetron, 4 mg, Q8H PRN  sodium chloride 0.9%, 10 mL, PRN  sodium chloride 0.9%, 10 mL, PRN  sodium chloride 0.9%, 10 mL, PRN      Today I personally reviewed pertinent medications, lines/drains/airways, imaging, cardiology results, laboratory results, notably:    Diet  Diet Cardiac  Diet Cardiac

## 2022-02-19 NOTE — H&P
Inpatient Radiology Pre-procedure Note    History of Present Illness:  Andry Cantu is a 56 y.o. male who presents for cerebral angiogram and possible mechanical thrombectomy for Right ICA and M1 occlusion.  Admission H&P reviewed.  No past medical history on file.  No past surgical history on file.    Review of Systems:   As documented in primary team H&P    Home Meds:   Prior to Admission medications    Not on File     Scheduled Meds:    sodium chloride 0.9%  50 mL Intravenous Once    alteplase  81 mg Intravenous Once     Continuous Infusions:    nicardipine       PRN Meds:iohexoL  Anticoagulants/Antiplatelets: no anticoagulation    Allergies: Review of patient's allergies indicates:  No Known Allergies  Sedation Hx: have not been any systemic reactions    Labs:  Recent Labs   Lab 02/18/22 1820   INR 1.1       Recent Labs   Lab 02/18/22 1820   WBC 11.11   HGB 15.3   HCT 45.4   MCV 86       No results for input(s): GLU, NA, K, CL, CO2, BUN, CREATININE, CALCIUM, MG, ALT, AST, ALBUMIN, BILITOT, BILIDIR in the last 168 hours.      Vitals:  Temp: 97.5 °F (36.4 °C) (02/18/22 1749)  Pulse: (!) 113 (02/18/22 1749)  Resp: 15 (02/18/22 1749)  BP: (!) 166/90 (02/18/22 1749)  SpO2: 95 % (02/18/22 1749)     Physical Exam:  ASA: 3  Mallampati: 2    General: mild distress   Mental Status:drowsy arousable  HEENT: normocephalic, atraumatic  Chest: unlabored breathing  Heart: regular heart rate  Abdomen: nondistended  Neuro: Left sided weakness     Plan: Cerebral angiogram possible thrombectomy. Informed consent obtained.  Sedation Plan: Up to moderate.    Shawn Coley MD  Department of Radiology  Pager: [phone]

## 2022-02-19 NOTE — PROGRESS NOTES
[facility] Critical Care  Neurocritical Care  Progress Note    Admit Date: 2/18/2022  Service Date: 02/19/2022  Length of Stay: 1    Subjective:     Chief Complaint: Stroke due to embolism of right middle cerebral artery    History of Present Illness: Mr. Wilde is a 56 year old male, PMH of HTN and COVID last month (no hospitalization) presenting with left sided weakness, slurred speech, and left facial droop. Last known normal was 1530. CTA showed occlusion of R MCA. Patient now s/p thrombectomy of R MCA/ICA with TICI 2c reperfusion after 2 passes. NIH in ED was 13. Will be admitted to neuro critical care for further monitoring and higher level of care.       Hospital Course: Out of window for tpa, s/p thrombectomy with TICI 3 flow  Substantial improvement in NIHSS today with no drift on right, intact language, no dysarthria, and full gaze bilat      Interval History:      ROS:  All systems reviewed and are negative  Objective:     Vitals:  Temp: 98 °F (36.7 °C)  Pulse: 91  Rhythm: normal sinus rhythm  BP: (!) 147/86  MAP (mmHg): 110  Resp: (!) 22  SpO2: 96 %  O2 Device (Oxygen Therapy): room air    Temp  Min: 97.5 °F (36.4 °C)  Max: 98 °F (36.7 °C)  Pulse  Min: 80  Max: 113  BP  Min: 115/84  Max: 168/98  MAP (mmHg)  Min: 84  Max: 114  Resp  Min: 10  Max: 30  ETCO2 (mmHg)  Min: 16 mmHg  Max: 22 mmHg  SpO2  Min: 94 %  Max: 100 %    02/18 0701 - 02/19 0700  In: 520 [P.O.:520]  Out: 700 [Urine:700]           Physical Exam  General: no acute distress, alert  Head: normocephalic  Eyes: XI,EOMI  Lungs:CTA  Heart: regular rate and rhthym  Abd: soft, NT  No swelling,edema  No facial asymmetry or VF abnormality  Symmetric motor exam  No dysarthria or aphasia    Medications:  Continuous Scheduledsodium chloride 0.9%, 50 mL, Once  aspirin, 81 mg, Daily  atorvastatin, 40 mg, Daily  heparin (porcine), 5,000 Units, Q8H  mupirocin, , BID  senna-docusate 8.6-50 mg, 1 tablet, BID    PRNacetaminophen, 650 mg, Q6H  PRN  hydrALAZINE, 10 mg, Q6H PRN  iohexoL, 125 mL, ONCE PRN  labetalol, 10 mg, Q15 Min PRN  ondansetron, 4 mg, Q8H PRN  sodium chloride 0.9%, 10 mL, PRN  sodium chloride 0.9%, 10 mL, PRN  sodium chloride 0.9%, 10 mL, PRN      Today I personally reviewed pertinent medications, lines/drains/airways, imaging, cardiology results, laboratory results, notably:    Diet  Diet Cardiac  Diet Cardiac          Assessment/Plan:     Neuro  * Stroke due to embolism of right middle cerebral artery  56M, LKN on 2/18/22 at 3:30 PM, presented with R MCA stroke. Now s/p thrombectomy with TICI 2c after 2 passes. Presenting NIH 13.    - Admit to [facility]  - Q1h neuro checks  - SBP < 160  - CBC, CMP  - SLP, PT, OT eval  - TSH, A1c  - TTE  No large artery stenosis on CTA    Cardiac/Vascular  Essential hypertension  SBP < 140, immediately after procedure and next 24 hrs    Paroxysmal atrial fibrillation  Metoprolol PRN  Will monitor  If no significant hemorrhage on mri, can change asa to eliquis          The patient is being Prophylaxed for:  Venous Thromboembolism with: Chemical  Stress Ulcer with: None  Ventilator Pneumonia with: not applicable    Activity Orders          Diet Cardiac: Cardiac starting at 02/19 0936    Progressive Mobility Protocol (mobilize patient to their highest level of functioning at least twice daily) starting at 02/19 0800    Turn patient starting at 02/18 2200    Elevate HOB Collagen closure or PERCLOSE plug/device - Elevate HOB 30 degrees with limb immobilized for 2 hours after procedure. starting at 02/18 2006    Elevate HOB starting at 02/18 1852        Full Code    Natalio Poole MD  Neurocritical Care  [facility] - Neuro Critical Care

## 2022-02-19 NOTE — CARE UPDATE
RAPID RESPONSE NURSE IR NOTE       Admit Date: 2022  LOS: 0  Code Status: Full Code   Date of Consult: 2022  : 1965  Age: 56 y.o.  Weight:   Wt Readings from Last 1 Encounters:   22 108 kg (238 lb)     Sex: male  Race: White   Bed: [facility]/90 A:   MRN: [id number]  Time Rapid Response Team notified: 1840  Time Rapid Response Team at bedside:    Time Rapid Response Team left bedside:   Was the patient discharged from an ICU this admission?   no  Was the patient discharged from a PACU within last 24 hours?  no  Did the patient receive conscious sedation/general anesthesia within last 24 hours?  no  Was the patient in the ED within the past 24 hours?  no  Was the patient started on NIPPV within the past 24 hours?  no  Did this progress into an ARC or CPA:  no  Attending Physician: Harjinder Rosenberg MD  Primary Service: Networked reference to record PCT     SITUATION  I  Reason for Call: IR thrombectomy    BACKGROUND    Why is the patient in the hospital?: Stroke due to embolism of right middle cerebral artery  Patient has no past medical history on file.    ASSESSMENT    Last know well time: 1530    IR arrival time: In room: 185  TICI Score: TICI Score: 2C  IR procedure end time: Procedure End Time:     Last VS: BP (!) 149/81   Pulse 97   Temp 97.5 °F (36.4 °C) (Oral)   Resp 10   Wt 108 kg (238 lb)   SpO2 99%     24H Vital Sign Range:  Temp:  [97.5 °F (36.4 °C)]   Pulse:  []   Resp:  [10-26]   BP: (132-168)/(65-99)   SpO2:  [95 %-100 %]     BP parameters: TICI 2c maintain SBP < 160    Bedrest: Closure device utilized. Patient must remain flat for 2 hours after Procedure End Time: .    FREQUENT DOCUMENTION    Post procedure VS, Neuro and groin site checks: Q15m x 2H, Q30min x 6H, then hourly    See flowsheets for further documentation.    FOLLOW-UP/CONTINGENCY PLAN    Call the Rapid Response Nurse, Mahnaz Deutsch RN at [phone] for additional questions or  concerns.    PROVIDER ESCALATION    Vascular Neurology provider you spoke with: MD Jasper and MD Italo    Disposition: Tx in ICU bed 9086.

## 2022-02-19 NOTE — PLAN OF CARE
Problem: Occupational Therapy Goal  Goal: Occupational Therapy Goal  Description: Goals to be met by: 3/5/2022     Patient will increase functional independence with ADLs by performing:    Feeding with [place].  UE Dressing with Modified [place].  LE Dressing with Supervision.  Grooming while standing at sink with Supervision.  Toileting from toilet with Modified Coleman for hygiene and clothing management.   Toilet transfer to toilet with Supervision.    Outcome: Ongoing, Progressing     Evaluation complete; goals and POC established.

## 2022-02-19 NOTE — PLAN OF CARE
Pt arrived to IR room 4 for thrombectomy, no acute distress noted. Orders, consent and labs reviewed on chart.

## 2022-02-19 NOTE — PLAN OF CARE
[facility] Care Plan    POC reviewed with Andry Cantu and family at 1400. Pt verbalized understanding. Questions and concerns addressed. No acute events today. Pt progressing toward goals. Will continue to monitor MRI done today. Patient is alert and oriented time 4.. See below and flowsheets for full assessment and VS info.       Neuro:  Denisse Coma Scale  Best Eye Response: 4-->(E4) spontaneous  Best Motor Response: 6-->(M6) obeys commands  Best Verbal Response: 5-->(V5) oriented  [place] Coma Scale Score: 15  Pupil PERRLA: yes     24 hr Temp:  [97.5 °F (36.4 °C)-98.2 °F (36.8 °C)]     CV:   Rhythm: normal sinus rhythm  BP goals:   SBP < 160  MAP > 65    Resp:   O2 Device (Oxygen Therapy): room air       Plan: N/A    GI/:     Diet/Nutrition Received: low saturated fat/low cholesterol  Last Bowel Movement: 02/18/22       Intake/Output Summary (Last 24 hours) at 2/19/2022 1518  Last data filed at 2/19/2022 1200  Gross per 24 hour   Intake 770 ml   Output 2110 ml   Net -1340 ml     Unmeasured Output  Urine Occurrence: 1    Labs/Accuchecks:  Recent Labs   Lab 02/19/22  0016   WBC 9.67   RBC 5.33   HGB 15.5   HCT 46.1         Recent Labs   Lab 02/19/22  0016      K 3.9   CO2 25      BUN 14   CREATININE 0.7   ALKPHOS 51*   ALT 25   AST 20   BILITOT 0.9      Recent Labs   Lab 02/18/22  2238   INR 1.1   APTT 25.7    No results for input(s): CPK, CPKMB, TROPONINI, MB in the last 168 hours.    Electrolytes: N/A - electrolytes WDL  Accuchecks: Q6H    Gtts:      LDA/Wounds:  Lines/Drains/Airways       Peripheral Intravenous Line  Duration                  Peripheral IV - Single Lumen 02/18/22 1800 18 G Right Antecubital <1 day         Peripheral IV - Single Lumen 02/19/22 0000 Left;Posterior Hand <1 day                  Wounds: No  Wound care consulted: No

## 2022-02-19 NOTE — HOSPITAL COURSE
Out of window for tpa, s/p thrombectomy with TICI 3 flow  Substantial improvement in NIHSS today with no drift on right, intact language, no dysarthria, and full gaze bilat

## 2022-02-19 NOTE — H&P
[facility]  Neurocritical Care  History & Physical    Admit Date: 2/18/2022  Service Date: 02/18/2022  Length of Stay: 0    Subjective:     Chief Complaint: Stroke due to embolism of right middle cerebral artery    History of Present Illness: Mr. Wilde is a 56 year old male, PMH of HTN and COVID last month (no hospitalization) presenting with left sided weakness, slurred speech, and left facial droop. Last known normal was 1530. CTA showed occlusion of R MCA. Patient now s/p thrombectomy of R MCA/ICA with TICI 2c reperfusion after 2 passes. NIH in ED was 13. Will be admitted to neuro critical care for further monitoring and higher level of care.       No past medical history on file.  No past surgical history on file.   No current facility-administered medications on file prior to encounter.     No current outpatient medications on file prior to encounter.      Allergies: Patient has no known allergies.    No family history on file.     Review of Systems   Constitutional: Negative for chills and fever.   HENT: Negative for drooling.    Eyes: Negative for visual disturbance.   Respiratory: Negative for cough.    Cardiovascular: Negative for chest pain.   Gastrointestinal: Negative for nausea and vomiting.   Genitourinary: Negative for dysuria.   Musculoskeletal: Negative for arthralgias, back pain, myalgias and neck pain.   Skin: Negative for rash.   Neurological: Positive for facial asymmetry, speech difficulty and weakness. Negative for numbness.   Psychiatric/Behavioral: Negative for confusion.     Objective:     Vitals:    Temp: 97.8 °F (36.6 °C)  Pulse: 100  Rhythm: sinus tachycardia  BP: 124/85  MAP (mmHg): 100  Resp: 18  ETCO2 (mmHg): 22 mmHg  SpO2: 97 %  O2 Device (Oxygen Therapy): room air    Temp  Min: 97.5 °F (36.4 °C)  Max: 97.8 °F (36.6 °C)  Pulse  Min: 97  Max: 113  BP  Min: 124/85  Max: 168/98  MAP (mmHg)  Min: 100  Max: 100  Resp  Min: 10  Max: 26  ETCO2 (mmHg)  Min: 16 mmHg  Max:  22 mmHg  SpO2  Min: 95 %  Max: 100 %    No intake/output data recorded.         GA:  comfortable, no acute distress.   HEENT: No scleral icterus or JVD.   Pulmonary: Clear to auscultation A/L.   Cardiac: RRR S1 & S2 w/o rubs/murmurs/gallops.   Abdominal: Bowel sounds present x 4. No appreciable hepatosplenomegaly.  Skin: No jaundice, rashes, or visible lesions.  Neuro:  --GCS: E4 V4 M6  --LOC drowsy  --Mental Status:  Awake, oriented to self  --CN II-XII grossly intact.   --L facial droop  --Strength 5/5 RUE and RLE, 3/5 LUE LLE  --Pupils 4mm, PERRL.   --R gaze preference  --Corneal reflex, gag, cough intact.  --BRIGGS spont      Today I personally reviewed pertinent medications, lines/drains/airways, imaging, cardiology results, laboratory results, microbiology results, notably:      Assessment/Plan:     Neuro  * Stroke due to embolism of right middle cerebral artery  56M, LKN on 2/18/22 at 3:30 PM, presented with R MCA stroke. Now s/p thrombectomy with TICI 2c after 2 passes. Presenting NIH 13.    - Admit to NCC  - Q1h neuro checks  - SBP < 160  - CBC, CMP  - SLP, PT, OT eval  - TSH, A1c  - TTE    Cardiac/Vascular  Essential hypertension  SBP < 160    Paroxysmal atrial fibrillation  Metoprolol PRN  Will monitor          The patient is being Prophylaxed for:  Venous Thromboembolism with: Mechanical  Stress Ulcer with: Not Applicable   Ventilator Pneumonia with: not applicable    Activity Orders          Progressive Mobility Protocol (mobilize patient to their highest level of functioning at least twice daily) starting at 02/19 0800    Turn patient starting at 02/18 2200    Diet NPO: NPO starting at 02/18 2124    Elevate HOB Collagen closure or PERCLOSE plug/device - Elevate HOB 30 degrees with limb immobilized for 2 hours after procedure. starting at 02/18 2006    Elevate HOB starting at 02/18 1852        Full Code    Jerry Puckett MD  Neurocritical Care  [facility]

## 2022-02-19 NOTE — HOSPITAL COURSE
Pt presented to ED with LSW, L facial droop, and dysarthria. CT head with hyperdense R MCA vessel sign and no early ischemic changes. CTA multiphase with tandem R carotid and R M1 occlusions. Patient received tpa and then went for thrombectomy: Right cervical ICA occlusion with restoration of flow in the ICA with TICI 2 B flow in the right MCA territory on the first pass followed by aspiration of distal M2 occlusion on second pass with TICI 2C flow. Patient was admitted to [facility] and later stepped down to the Vascular Neurology service following post-tpa monitoring window.  Patient was noted to be in Afib in ED and remained in Afib during course of admission. TTE showed 55% EF, severe LAE, and without evidence of intracardiac thrombus. MRI brain WO contrast showed acute infarct predominantly involving the right basal ganglia without evidence of significant mass effect or midline shift; microhemorrhage right putamen without evidence of large hemorrhagic conversion. Etiology of CVA thought be cardioembolic in the setting of new-onset Afib. Patient experienced near complete resolution of symptoms post tx, NIHSS 1 post-therapy and progressing to NIHSS 0 at time of discharge. Started on Eliquis 5 mg BID on 2/20 for thromboembolic prophylaxis in setting of Afib; continued on discharge. No rate control required while IP. Lipitor 40 mg daily continued on discharge. Continued on home losartan 12.5 mg on discharge for BP control. Recommendations made for Home PT with referrals made on discharge. Instructed to follow-up with PCP within 2 weeks of discharge. To follow-up with Vascular neurology within 4-6 weeks. At time of departure patient hemodynamically stable, tolerating PO intake without issue and without acute complaints.    Close return instructions given to patient at time of discharge, patient voiced understanding. See Assessment and Plan from last day of admission's progress note for further details.

## 2022-02-19 NOTE — ASSESSMENT & PLAN NOTE
56M, MYRON on 2/18/22 at 3:30 PM, presented with R MCA stroke. Now s/p thrombectomy with TICI 2c after 2 passes. Presenting NIH 13.    - Admit to NCC  - Q1h neuro checks  - SBP < 160  - CBC, CMP  - SLP, PT, OT eval  - TSH, A1c  - TTE

## 2022-02-19 NOTE — ASSESSMENT & PLAN NOTE
Andry Cantu is a 56 y.o. male who presented to ED with LSW, L facial droop, and dysarthria. Stroke code called on ED arrival. Patient LKN at 1530. CT head with hyperdense R MCA vessel sign and no early ischemic changes. Patient was treated with alteplase. CTA multiphase with tandem R carotid and  R M1 occlusions. Patient going to IR for possible thrombectomy. Patient to be admitted to [facility] following IR.     Stroke etiology likely cardioembolic 2/2 atrial fibrillation    Antithrombotics for secondary stroke prevention: Antiplatelets: None: Hold all Antithrombotics x 24 hours after IV t-PA administration    Statins for secondary stroke prevention and hyperlipidemia, if present:   Statins: Atorvastatin- 40 mg daily    Aggressive risk factor modification: None     Rehab efforts: The patient has been evaluated by a stroke team provider and the therapy needs have been fully considered based off the presenting complaints and exam findings. The following therapy evaluations are needed: PT evaluate and treat, OT evaluate and treat, SLP evaluate and treat, PM&R evaluate for appropriate placement    Diagnostics ordered/pending: HgbA1C to assess blood glucose levels, Lipid Profile to assess cholesterol levels, MRI head without contrast to assess brain parenchyma, TTE to assess cardiac function/status , TSH to assess thyroid function    VTE prophylaxis: Mechanical prophylaxis: Place SCDs  None: Reason for No Pharmacological VTE Prophylaxis: Holding x 24 hours s/p treatment with alteplase (tPA)    BP parameters: Infarct: Post tPA, SBP <180

## 2022-02-19 NOTE — PT/OT/SLP EVAL
Occupational Therapy   Co-Evaluation & Treatment with Physical Therapy  Co-evaluation performed to safely facilitate mobility and functional tasks concurrently for comprehensive assessment.    Name: Andry Cantu  MRN: [id number]  Admitting Diagnosis:  Stroke due to embolism of right middle cerebral artery    Length of Stay: 1 days    Recommendations:     Discharge Recommendations: home health OT  Discharge Equipment Recommendations:  none  Barriers to discharge:  None    Plan:     Patient to be seen 3 x/week to address the above listed problems via self-care/home management, therapeutic activities, therapeutic exercises, neuromuscular re-education  · Plan of Care Expires: 03/21/22  · Plan of Care Reviewed with: patient      Assessment:     Andry Cantu is a 56 y.o. male with a medical diagnosis of Stroke due to embolism of right middle cerebral artery.  He presents with the following performance deficits affecting function: weakness, impaired endurance, impaired self care skills, impaired functional mobilty, gait instability, impaired balance, decreased coordination, decreased upper extremity function, impaired fine motor.      Pt mainly limited by decreased gross motor coordination on this date. Pt requiring stand by assistance for bed mobility, stand by assistance - contact guard assistance for functional mobility with no assistive device, and stand by assistance - minimum assistance for ADLs (LE dressing, toileting, grooming). Pt's strengths include intact LE strength, strong desire to regain independence, and strong support of wife, actively and appropriately involved throughout evaluation. Pt with good motivation and good tolerance for therapy. Pt would benefit from Home Health OT upon D/C to return to [facility].    Rehab Prognosis: Good; patient would benefit from acute skilled OT services to address these deficits and reach maximum level of function.         Subjective     Communicated with: RN prior to session.   "Patient found HOB elevated with peripheral IV, telemetry, pulse ox (continuous), blood pressure cuff, SCD and wife, unidentified family/friend present upon OT entry to room.    Chief Complaint: Extremity Weakness (LKN 1530)    Patient/Family Comments/goals: Pt's wife asked what she can do with pt to address deficits. OT instructed on continued participation in self-care ax as a means to improve UE strength and coordination.    Pain/Comfort:  Pain Rating 1: 0/10  Pain Rating Post-Intervention 1: 0/10    Patients cultural, spiritual, Cheondoism conflicts given the current situation: no    Occupational Profile:  Living Environment: Pt lives with wife in a [facility] with 2 NADEEM without HRs as well as a ramp entry to the back door. Pt's bathroom has a WIS.  Prior Level of Function: Patient reports being independent with mobility & with ADLs.   Patient uses DME as follows: none.   DME owned (not currently used): none.  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   Drive: yes.   Managing Medicines/Managing Home: yes.   Equipment Used at Home:  none    Patient reports they will have assistance from wife (who works) upon discharge.      Objective:     Patient found with: peripheral IV, telemetry, pulse ox (continuous), blood pressure cuff, SCD   General Precautions: Standard, fall   Orthopedic Precautions:N/A   Braces: N/A   Oxygen Device: Room Air  Vitals: BP (!) 146/70 (BP Location: Left arm, Patient Position: Lying)   Pulse 77   Temp 98 °F (36.7 °C) (Oral)   Resp 16   Ht 5' 9" (1.753 m)   Wt 114 kg (251 lb 5.2 oz)   SpO2 (!) 93%   BMI 37.11 kg/m²     Cognitive and Psychosocial Function:   · AxOx -- Not formally tested; no signs of disorientation noted   · Follows Commands/attention:follows multi-step commands  · Communication:  clear/fluent  · Memory: No Deficits noted  · Safety awareness/insight to disability: impaired   · Mood/Affect/Coping skills/emotional control: Appropriate to " situation and Pleasant    Hearing: Slightly hard of hearing    Vision:  Intact visual fields    Physical Exam:  Postural examination/scapula alignment:    -       Rounded shoulders  -       Forward head     Left UE (Edy) Right UE   UE Edema absent absent   UE ROM AROM WFL AROM WFL   UE Strength adequate ROM, minimally decreased strength at the shoulder 5/5    Strength moderate composite grasp grasp WFL   Sensation LUE INTACT:WFL RUE INTACT: WFL   Fine Motor Coordination:  LUE IMPAIRED: hand, finger to nose; manipulation of objects RUE INTACT: WFL   Gross Motor Coordination: LUE INTACT: WFL RUE INTACT:WFL     Occupational Performance:  Bed Mobility:    · Scooting to HOB in supine: stand by assistance  · Patient completed Supine to Sit with stand by assistance on right side of bed  · Scooting anteriorly to EOB to have both feet planted on floor: stand by assistance  · Patient completed Sit to Supine with stand by assistance on right side of bed    Functional Mobility/Transfers:   Static Sitting EOB: Supervision   Dynamic Sitting EOB: Stand by assistance   Patient completed Sit <> Stand Transfer with contact guard assistance  with  no assistive device    Static Standing Balance: Stand by assistance - contact guard assistance   Dynamic Standing Balance: Contact guard assistance   Functional Mobility: Pt ambulated within room and bathroom with stand by assistance - contact guard assistance with no assistive device   o 1 LOB however pt corrected independently  o SOB noted  o No c/o dizziness    Activities of Daily Living:  · Grooming: supervision to wash hands while standing at sink  · Lower Body Dressing: minimum assistance to don undergarment while seated EOB/standing; assist provided by wife  · Toileting: stand by assistance to void urine in urinal while standing at toilet      AMPA 6 Click ADL:  AMPAC Total Score: 20    Treatment & Education:  - Educated on role of OT, POC, and goals for this hospital  stay  - Emphasized importance of OOB ax and level of staff assistance required for transfers and functional mobility (contact guard assistance)  - Encouraged pt to alert OT of personal self-care goals and/or comfort-related concerns during future OT sessions  - Pt denies any further questions, concerns, or requests at this time  - Whiteboard updated        Patient left HOB elevated with all lines intact, call button in reach and wife, unidentified family/friend present    GOALS:   Multidisciplinary Problems     Occupational Therapy Goals        Problem: Occupational Therapy Goal    Goal Priority Disciplines Outcome Interventions   Occupational Therapy Goal     OT, PT/OT Ongoing, Progressing    Description: Goals to be met by: 3/5/2022     Patient will increase functional independence with ADLs by performing:    Feeding with Kimberly.  UE Dressing with Modified Kimberly.  LE Dressing with Supervision.  Grooming while standing at sink with Supervision.  Toileting from toilet with Modified Kimberly for hygiene and clothing management.   Toilet transfer to toilet with Supervision.                       History:     Past Medical History:   Diagnosis Date    Essential hypertension 2/18/2022    Paroxysmal atrial fibrillation 2/18/2022       No past surgical history on file.      Time Tracking:     OT Date of Treatment: 02/19/22  OT Start Time: 1036  OT Stop Time: 1057  OT Total Time (min): 21 min  Additional staff present: PT      Billable Minutes:Evaluation 10  Self Care/Home Management 11      2/19/2022

## 2022-02-19 NOTE — PLAN OF CARE
Thrombectomy completed, pt tolerated well. No apparent distress noted. Closer device deployed, elevate HOB at 2200. Dressing applied CDI. Report given to ESME Joya. Pt transferred to [facility] via bed accompanied by RR team.

## 2022-02-19 NOTE — PROGRESS NOTES
[facility]    Type of stroke/diagnosis:  R MCA    TPA start 1830, end time: 1930    Thrombectomy end time: 2000     Current symptoms: LSW, slurred speech, L facial droop    Skin assessment done: Y    Wounds noted: IR R groin site     Luo Completed? pending    Patient Belongings on Admit: wedding band - wife has hearind aid    NCC notified: Dr. Puckett

## 2022-02-19 NOTE — ASSESSMENT & PLAN NOTE
Stroke RF   Patient on losartan 12.5mg at home per patient's family   SBP goal <140 s/p successful thrombectomy

## 2022-02-19 NOTE — SUBJECTIVE & OBJECTIVE
No past medical history on file.  No past surgical history on file.   No current facility-administered medications on file prior to encounter.     No current outpatient medications on file prior to encounter.      Allergies: Patient has no known allergies.    No family history on file.     Review of Systems   Constitutional: Negative for chills and fever.   HENT: Negative for drooling.    Eyes: Negative for visual disturbance.   Respiratory: Negative for cough.    Cardiovascular: Negative for chest pain.   Gastrointestinal: Negative for nausea and vomiting.   Genitourinary: Negative for dysuria.   Musculoskeletal: Negative for arthralgias, back pain, myalgias and neck pain.   Skin: Negative for rash.   Neurological: Positive for facial asymmetry, speech difficulty and weakness. Negative for numbness.   Psychiatric/Behavioral: Negative for confusion.     Objective:     Vitals:    Temp: 97.8 °F (36.6 °C)  Pulse: 100  Rhythm: sinus tachycardia  BP: 124/85  MAP (mmHg): 100  Resp: 18  ETCO2 (mmHg): 22 mmHg  SpO2: 97 %  O2 Device (Oxygen Therapy): room air    Temp  Min: 97.5 °F (36.4 °C)  Max: 97.8 °F (36.6 °C)  Pulse  Min: 97  Max: 113  BP  Min: 124/85  Max: 168/98  MAP (mmHg)  Min: 100  Max: 100  Resp  Min: 10  Max: 26  ETCO2 (mmHg)  Min: 16 mmHg  Max: 22 mmHg  SpO2  Min: 95 %  Max: 100 %    No intake/output data recorded.         GA:  comfortable, no acute distress.   HEENT: No scleral icterus or JVD.   Pulmonary: Clear to auscultation A/L.   Cardiac: RRR S1 & S2 w/o rubs/murmurs/gallops.   Abdominal: Bowel sounds present x 4. No appreciable hepatosplenomegaly.  Skin: No jaundice, rashes, or visible lesions.  Neuro:  --GCS: E4 V4 M6  --LOC drowsy  --Mental Status:  Awake, oriented to self  --CN II-XII grossly intact.   --L facial droop  --Strength 5/5 RUE and RLE, 3/5 LUE LLE  --Pupils 4mm, PERRL.   --R gaze preference  --Corneal reflex, gag, cough intact.  --BRIGGS spont      Today I personally reviewed pertinent  medications, lines/drains/airways, imaging, cardiology results, laboratory results, microbiology results, notably:

## 2022-02-19 NOTE — SUBJECTIVE & OBJECTIVE
Neurologic Chief Complaint: R MCA syndrome     Subjective:     Interval History: Patient is seen for follow-up neurological assessment and treatment recommendations:     Patient s/p tpa and TICI 2b following thrombectomy. He is doing well in NCC. Patient able to move all extremities spontaneously. No aphasia appreciated on exam. He is alert and able to follow commands. EOMs intact. L facial droop present and mild LUE drift. Patient noted to be in AF on telemetry during exam. Discussed the need for anticoagulation in the near future with patient and family. TTE with EF 55%, normal function, and severe LAE. MRI pending.     HPI, Past Medical, Family, and Social History remains the same as documented in the initial encounter.     Review of Systems   Constitutional:  Negative for chills, diaphoresis, fatigue and fever.   HENT:  Negative for drooling, ear discharge, hearing loss, rhinorrhea and trouble swallowing.    Eyes:  Negative for photophobia, pain, redness and visual disturbance.   Respiratory:  Negative for cough, choking and shortness of breath.    Cardiovascular:  Negative for leg swelling.   Gastrointestinal:  Negative for abdominal pain, diarrhea, nausea and vomiting.   Genitourinary:  Negative for difficulty urinating.   Neurological:  Negative for facial asymmetry, speech difficulty, weakness, numbness and headaches.   Psychiatric/Behavioral:  Negative for agitation, behavioral problems and confusion. The patient is not nervous/anxious.    Scheduled Meds:   sodium chloride 0.9%  50 mL Intravenous Once    aspirin  81 mg Oral Daily    atorvastatin  40 mg Oral Daily    heparin (porcine)  5,000 Units Subcutaneous Q8H    mupirocin   Nasal BID    senna-docusate 8.6-50 mg  1 tablet Oral BID     Continuous Infusions:  PRN Meds:acetaminophen, hydrALAZINE, iohexoL, labetalol, ondansetron, sodium chloride 0.9%, sodium chloride 0.9%, sodium chloride 0.9%    Objective:     Vital Signs (Most Recent):  Temp: 98 °F (36.7  °C) (02/19/22 1115)  Pulse: 81 (02/19/22 1300)  Resp: 19 (02/19/22 1300)  BP: 127/75 (02/19/22 1300)  SpO2: (!) 94 % (02/19/22 1300)  BP Location: Left arm    Vital Signs Range (Last 24H):  Temp:  [97.5 °F (36.4 °C)-98 °F (36.7 °C)]   Pulse:  []   Resp:  [10-30]   BP: (115-168)/()   SpO2:  [94 %-100 %]   BP Location: Left arm    Physical Exam  Vitals and nursing note reviewed.   Constitutional:       General: He is not in acute distress.     Appearance: He is obese. He is not ill-appearing or diaphoretic.   HENT:      Head: Normocephalic and atraumatic.      Right Ear: External ear normal.      Left Ear: External ear normal.      Nose: Nose normal. No rhinorrhea.      Mouth/Throat:      Mouth: Mucous membranes are moist.   Eyes:      General: No visual field deficit or scleral icterus.        Right eye: No discharge.         Left eye: No discharge.      Extraocular Movements: Extraocular movements intact.   Cardiovascular:      Rate and Rhythm: Normal rate.   Pulmonary:      Effort: Pulmonary effort is normal.   Abdominal:      General: There is no distension.   Musculoskeletal:         General: No tenderness, deformity or signs of injury. Normal range of motion.      Right lower leg: No edema.      Left lower leg: No edema.   Skin:     General: Skin is warm and dry.   Neurological:      Mental Status: He is alert and oriented to person, place, and time.      Cranial Nerves: Facial asymmetry present.      Sensory: No sensory deficit.      Motor: No weakness.      Coordination: Finger-Nose-Finger Test normal.      Comments: Mild LUE drift   L Facial droop    Psychiatric:         Attention and Perception: Attention normal.         Mood and Affect: Mood normal.         Behavior: Behavior normal. Behavior is cooperative.       Neurological Exam:   LOC: alert  Attention Span: Good   Language: No aphasia  Articulation: No dysarthria  Orientation: Person, Place, Time   Visual Fields: Full  EOM (CN III, IV, VI):  Full/intact  Facial Movement (CN VII): Lower facial weakness on the Left  Motor: Arm left  Normal 5/5  Leg left  Normal 5/5  Arm right  Normal 5/5  Leg right Normal 5/5  Cerebellum: No evidence of appendicular or axial ataxia  Sensation: Intact to light touch, temperature and vibration    Laboratory:  CMP:   Recent Labs   Lab 02/19/22  0016   CALCIUM 9.2   ALBUMIN 3.6   PROT 6.7      K 3.9   CO2 25      BUN 14   CREATININE 0.7   ALKPHOS 51*   ALT 25   AST 20   BILITOT 0.9     BMP:   Recent Labs   Lab 02/19/22  0016      K 3.9      CO2 25   BUN 14   CREATININE 0.7   CALCIUM 9.2     CBC:   Recent Labs   Lab 02/19/22  0016   WBC 9.67   RBC 5.33   HGB 15.5   HCT 46.1      MCV 87   MCH 29.1   MCHC 33.6     Lipid Panel:   Recent Labs   Lab 02/18/22  1820   CHOL 135   LDLCALC 88.4   HDL 31*   TRIG 78     Coagulation:   Recent Labs   Lab 02/18/22  2238   INR 1.1   APTT 25.7     Platelet Aggregation Study: No results for input(s): PLTAGG, PLTAGINTERP, PLTAGREGLACO, ADPPLTAGGREG in the last 168 hours.  Hgb A1C:   Recent Labs   Lab 02/18/22 2238   HGBA1C 5.0     TSH:   Recent Labs   Lab 02/18/22  1820   TSH 1.540       Diagnostic Results     Brain imaging:  MRI Brain WO Contrast pending     Vessel Imaging:  IR Angiogram Carotid Cerebral BL 2/18/22   Right cervical ICA occlusion with restoration of flow in the ICA and TICI 2 B flow in the right MCA territory on the first pass. There was a distal M2 occlusion which was aspirated on the second pass with 2C flow.      CTA Multiphase. Date: 02/18/22    No hemorrhage  No early ischemic changes  Hyperdense R M1 sign   R carotid/R M1 tandem occlusion     Cardiac Evaluation:   TTE 2/19/22  The estimated ejection fraction is 55%.  The left ventricle is normal in size with normal systolic function.  Normal left ventricular diastolic function.  Normal right ventricular size with normal right ventricular systolic function.  Severe left atrial  enlargement.  Normal central venous pressure (3 mmHg).

## 2022-02-19 NOTE — PLAN OF CARE
PT Eval complete and POC established.    2022    Problem: Physical Therapy Goal  Goal: Physical Therapy Goal  Description: Goals to be met by: 3/5/2022     Patient will increase functional independence with mobility by performin. Supine to sit with [place]  2. Sit to supine with [place]  3. Sit to stand transfer with Supervision  4. Gait  x 200 feet with Supervision using No Assistive Device.     Outcome: Ongoing, Progressing

## 2022-02-19 NOTE — ASSESSMENT & PLAN NOTE
New Onset   Stroke risk factor.   Noted on telemetry in ED and again in NCC.   Will need AC in the near future   Discussed with patient and family

## 2022-02-19 NOTE — HPI
Mr. Wilde is a 56 year old male, PMH of HTN and COVID last month (no hospitalization) presenting with left sided weakness, slurred speech, and left facial droop. Last known normal was 1530. CTA showed occlusion of R MCA. Patient now s/p thrombectomy of R MCA/ICA with TICI 2c reperfusion after 2 passes. NIH in ED was 13. Will be admitted to neuro critical care for further monitoring and higher level of care.

## 2022-02-19 NOTE — PROGRESS NOTES
[facility]  Vascular Neurology  [facility]  Progress Note    Assessment/Plan:     * Stroke due to embolism of right middle cerebral artery  Andry Cantu is a 56 y.o. male who presented to ED with LSW, L facial droop, and dysarthria. CT head with hyperdense R MCA vessel sign and no early ischemic changes. Patient was treated with alteplase. CTA multiphase with tandem R carotid and  R M1 occlusions. Patient received tpa and then went for thrombectomy, TICI 2b. Patient was admitted to [facility]. TTE showed 55% EF, severe LAE, and normal function. Patient was noted to be in AF in ED and remains in AF in NCC. MRI pending. Therapy recommendations pending.     Etiology: Cardioembolic (new onset A Fib)     Antithrombotics for secondary stroke prevention: Antiplatelets: None: Hold all Antithrombotics x 24 hours after IV t-PA administration ---Will need AC in the future, pending MRI to determine when to start AC       Statins for secondary stroke prevention and hyperlipidemia, if present:   Statins: Atorvastatin- 40 mg daily    Aggressive risk factor modification: None     Rehab efforts: The patient has been evaluated by a stroke team provider and the therapy needs have been fully considered based off the presenting complaints and exam findings. The following therapy evaluations are needed: PT evaluate and treat, OT evaluate and treat, SLP evaluate and treat, PM&R evaluate for appropriate placement--pending recommendations; Regular/Thin diet    Diagnostics ordered/pending: MRI head without contrast to assess brain parenchyma    VTE prophylaxis: Mechanical prophylaxis: Place SCDs  None: Reason for No Pharmacological VTE Prophylaxis: Holding x 24 hours s/p treatment with alteplase (tPA)--will need heparin 5000 units q8h for VTE prophylaxis when appropriate    BP parameters: Infarct: Post sucessful thrombectomy, SBP <140        Essential hypertension  Stroke RF   Patient on losartan 12.5mg at home  per patient's family   SBP goal <140 s/p successful thrombectomy       Paroxysmal atrial fibrillation  New Onset   Stroke risk factor.   Noted on telemetry in ED and again in NCC.   Will need AC in the near future   Discussed with patient and family        02/19/2022  Patient s/p tpa and TICI 2b following thrombectomy. He is doing well in NCC. Patient able to move all extremities spontaneously. No aphasia appreciated on exam. He is alert and able to follow commands. EOMs intact. L facial droop present and mild LUE drift. Patient noted to be in AF on telemetry during exam. Discussed the need for anticoagulation in the near future with patient and family. TTE with EF 55%, normal function, and severe LAE. MRI pending.       STROKE DOCUMENTATION   Acute Stroke Times   Last Known Normal Date: 02/18/22  Last Known Normal Time: 1521  Symptom Onset Date: 02/18/22  Symptom Onset Time: 1530  Stroke Team Called Date: 02/18/22  Stroke Team Called Time: 1757  Stroke Team Arrival Date: 02/18/22  Stroke Team Arrival Time: 1800  CT Interpretation Time: 1813  Alteplase Recommended: Yes  CTA Interpretation Time: 1820  Thrombectomy Recommended: Yes  Decision to Treat Time for Alteplase: 1823  Decision to Treat Time for IR: 1847    NIH Scale:  1a. Level of Consciousness: 0-->Alert, keenly responsive  1b. LOC Questions: 0-->Answers both questions correctly  1c. LOC Commands: 0-->Performs both tasks correctly  2. Best Gaze: 0-->Normal  3. Visual: 0-->No visual loss  4. Facial Palsy: 1-->Minor paralysis (flattened nasolabial fold, asymmetry on smiling)  5a. Motor Arm, Left: 1-->Drift, limb holds 90 (or 45) degrees, but drifts down before full 10 seconds, does not hit bed or other support  5b. Motor Arm, Right: 0-->No drift, limb holds 90 (or 45) degrees for full 10 secs  6a. Motor Leg, Left: 0-->No drift, leg holds 30 degree position for full 5 secs  6b. Motor Leg, Right: 0-->No drift, leg holds 30 degree position for full 5 secs  7. Limb  Ataxia: 0-->Absent  8. Sensory: 0-->Normal, no sensory loss  9. Best Language: 0-->No aphasia, normal  10. Dysarthria: 0-->Normal  11. Extinction and Inattention (formerly Neglect): 0-->No abnormality  Total (NIH Stroke Scale): 2       Modified [place] Score: 0  [place] Coma Scale:    ABCD2 Score:    [id number] Score:3  HAS -BLED Score:   ICH Score:   Hunt & Tsai Classification:      Hemorrhagic change of an Ischemic Stroke: Does this patient have an ischemic stroke with hemorrhagic changes? No     Neurologic Chief Complaint: R MCA syndrome     Subjective:     Interval History: Patient is seen for follow-up neurological assessment and treatment recommendations:     Patient s/p tpa and TICI 2b following thrombectomy. He is doing well in NCC. Patient able to move all extremities spontaneously. No aphasia appreciated on exam. He is alert and able to follow commands. EOMs intact. L facial droop present and mild LUE drift. Patient noted to be in AF on telemetry during exam. Discussed the need for anticoagulation in the near future with patient and family. TTE with EF 55%, normal function, and severe LAE. MRI pending.     HPI, Past Medical, Family, and Social History remains the same as documented in the initial encounter.     Review of Systems   Constitutional:  Negative for chills, diaphoresis, fatigue and fever.   HENT:  Negative for drooling, ear discharge, hearing loss, rhinorrhea and trouble swallowing.    Eyes:  Negative for photophobia, pain, redness and visual disturbance.   Respiratory:  Negative for cough, choking and shortness of breath.    Cardiovascular:  Negative for leg swelling.   Gastrointestinal:  Negative for abdominal pain, diarrhea, nausea and vomiting.   Genitourinary:  Negative for difficulty urinating.   Neurological:  Negative for facial asymmetry, speech difficulty, weakness, numbness and headaches.   Psychiatric/Behavioral:  Negative for agitation, behavioral problems and confusion. The  patient is not nervous/anxious.    Scheduled Meds:   sodium chloride 0.9%  50 mL Intravenous Once    aspirin  81 mg Oral Daily    atorvastatin  40 mg Oral Daily    heparin (porcine)  5,000 Units Subcutaneous Q8H    mupirocin   Nasal BID    senna-docusate 8.6-50 mg  1 tablet Oral BID     Continuous Infusions:  PRN Meds:acetaminophen, hydrALAZINE, iohexoL, labetalol, ondansetron, sodium chloride 0.9%, sodium chloride 0.9%, sodium chloride 0.9%    Objective:     Vital Signs (Most Recent):  Temp: 98 °F (36.7 °C) (02/19/22 1115)  Pulse: 81 (02/19/22 1300)  Resp: 19 (02/19/22 1300)  BP: 127/75 (02/19/22 1300)  SpO2: (!) 94 % (02/19/22 1300)  BP Location: Left arm    Vital Signs Range (Last 24H):  Temp:  [97.5 °F (36.4 °C)-98 °F (36.7 °C)]   Pulse:  []   Resp:  [10-30]   BP: (115-168)/()   SpO2:  [94 %-100 %]   BP Location: Left arm    Physical Exam  Vitals and nursing note reviewed.   Constitutional:       General: He is not in acute distress.     Appearance: He is obese. He is not ill-appearing or diaphoretic.   HENT:      Head: Normocephalic and atraumatic.      Right Ear: External ear normal.      Left Ear: External ear normal.      Nose: Nose normal. No rhinorrhea.      Mouth/Throat:      Mouth: Mucous membranes are moist.   Eyes:      General: No visual field deficit or scleral icterus.        Right eye: No discharge.         Left eye: No discharge.      Extraocular Movements: Extraocular movements intact.   Cardiovascular:      Rate and Rhythm: Normal rate.   Pulmonary:      Effort: Pulmonary effort is normal.   Abdominal:      General: There is no distension.   Musculoskeletal:         General: No tenderness, deformity or signs of injury. Normal range of motion.      Right lower leg: No edema.      Left lower leg: No edema.   Skin:     General: Skin is warm and dry.   Neurological:      Mental Status: He is alert and oriented to person, place, and time.      Cranial Nerves: Facial asymmetry  present.      Sensory: No sensory deficit.      Motor: No weakness.      Coordination: Finger-Nose-Finger Test normal.      Comments: Mild LUE drift   L Facial droop    Psychiatric:         Attention and Perception: Attention normal.         Mood and Affect: Mood normal.         Behavior: Behavior normal. Behavior is cooperative.       Neurological Exam:   LOC: alert  Attention Span: Good   Language: No aphasia  Articulation: No dysarthria  Orientation: Person, Place, Time   Visual Fields: Full  EOM (CN III, IV, VI): Full/intact  Facial Movement (CN VII): Lower facial weakness on the Left  Motor: Arm left  Normal 5/5  Leg left  Normal 5/5  Arm right  Normal 5/5  Leg right Normal 5/5  Cerebellum: No evidence of appendicular or axial ataxia  Sensation: Intact to light touch, temperature and vibration    Laboratory:  CMP:   Recent Labs   Lab 02/19/22  0016   CALCIUM 9.2   ALBUMIN 3.6   PROT 6.7      K 3.9   CO2 25      BUN 14   CREATININE 0.7   ALKPHOS 51*   ALT 25   AST 20   BILITOT 0.9     BMP:   Recent Labs   Lab 02/19/22  0016      K 3.9      CO2 25   BUN 14   CREATININE 0.7   CALCIUM 9.2     CBC:   Recent Labs   Lab 02/19/22  0016   WBC 9.67   RBC 5.33   HGB 15.5   HCT 46.1      MCV 87   MCH 29.1   MCHC 33.6     Lipid Panel:   Recent Labs   Lab 02/18/22  1820   CHOL 135   LDLCALC 88.4   HDL 31*   TRIG 78     Coagulation:   Recent Labs   Lab 02/18/22  2238   INR 1.1   APTT 25.7     Platelet Aggregation Study: No results for input(s): PLTAGG, PLTAGINTERP, PLTAGREGLACO, ADPPLTAGGREG in the last 168 hours.  Hgb A1C:   Recent Labs   Lab 02/18/22  2238   HGBA1C 5.0     TSH:   Recent Labs   Lab 02/18/22  1820   TSH 1.540       Diagnostic Results     Brain imaging:  MRI Brain WO Contrast pending     Vessel Imaging:  IR Angiogram Carotid Cerebral BL 2/18/22   Right cervical ICA occlusion with restoration of flow in the ICA and TICI 2 B flow in the right MCA territory on the first pass. There  was a distal M2 occlusion which was aspirated on the second pass with 2C flow.      CTA Multiphase. Date: 02/18/22    No hemorrhage  No early ischemic changes  Hyperdense R M1 sign   R carotid/R M1 tandem occlusion     Cardiac Evaluation:   TTE 2/19/22  · The estimated ejection fraction is 55%.  · The left ventricle is normal in size with normal systolic function.  · Normal left ventricular diastolic function.  · Normal right ventricular size with normal right ventricular systolic function.  · Severe left atrial enlargement.  · Normal central venous pressure (3 mmHg).        Uriel Ramsey PA-C  [facility]  Department of Vascular Neurology   [facility]

## 2022-02-19 NOTE — PT/OT/SLP EVAL
Speech Language Pathology Evaluation  Bedside Swallow    Patient Name:  Andry Cantu   MRN:  [id number]  Admitting Diagnosis: Stroke due to embolism of right middle cerebral artery    Recommendations:                 General Recommendations:  Speech language evaluation and Cognitive-linguistic evaluation  Diet recommendations:  Regular, Thin   Aspiration Precautions: Standard aspiration precautions   General Precautions: Standard    Communication strategies:  none    History:     No past medical history on file.    No past surgical history on file.    Prior Intubation HX:  n/a    Modified Barium Swallow: n/a    Chest X-Rays: 2/18/22- Findings suggestive of congestive heart failure with developing pulmonary edema.    Prior diet: Regular diet with thin liquids    Occupation/hobbies/homemaking: none stated    Subjective     Pt found resting in bed upon SLP entry into room. Pt agreeable to participate in all aspects of session.     Patient goals: none stated     Pain/Comfort:  · Pain Rating 1: 0/10    Respiratory Status: Room air    Objective:     Oral Musculature Evaluation  · Oral Musculature: WFL  · Dentition: present and adequate  · Mucosal Quality: good  · Mandibular Strength and Mobility: WFL  · Oral Labial Strength and Mobility: functional retraction, functional pursing, functional seal  · Lingual Strength and Mobility: functional protrusion, functional anterior elevation, functional lateral movement  · Velar Elevation: WFL  · Buccal Strength and Mobility: WFL  · Volitional Cough: productive  · Voice Prior to PO Intake: clear, strong    Bedside Swallow Eval:   Consistencies Assessed:  Thin liquids: self-fed by the single and consecutive open cup and straw sips  Pudding: self-fed by the tsp of pudding  · Solid: self-fed geo cracker    Oral Phase:   · WFL    Pharyngeal Phase:   · no overt clinical signs/symptoms of aspiration  · no overt clinical signs/symptoms of pharyngeal dysphagia    Compensatory  Strategies  · None    Treatment: SLP provided education regarding diet consistency reocmmendation, overt s/s of aspiration, safe swallow strategies, and SLP POC. Pt verbalized understanding and had no additional questions or concerns upon SLP exit.     Assessment:     Andry Cantu is a 56 y.o. male who presents with a functional swallow for PO intake of an unrestricted diet. Pt exhibits low risk for development of aspiration-related complications. SLP to continue to follow.     Goals:   Multidisciplinary Problems     SLP Goals        Problem: SLP Goal    Goal Priority Disciplines Outcome   SLP Goal     SLP Ongoing, Progressing   Description: Speech Pathology Goals  To be met by 2/5/22    1. Pt will exhibit a functional swallow for tolerance of a regular diet with thin liquids in order to maintain adequate hydration and nutrition  2. Pt will undergo formal speech and language evaluation to determine presence/severity of speech, language, and/or cognitive linguistic impairment                                Plan:     · Patient to be seen:  3 x/week   · Plan of Care expires:  03/21/22  · Plan of Care reviewed with:  patient   · SLP Follow-Up:  Yes       Discharge recommendations:   (TBD)   Barriers to Discharge:  Level of Skilled Assistance Needed     Time Tracking:     SLP Treatment Date:   02/19/22  Speech Start Time:  0840  Speech Stop Time:  0850     Speech Total Time (min):  10 min    Billable Minutes: Eval Swallow and Oral Function 10      02/19/2022

## 2022-02-19 NOTE — SUBJECTIVE & OBJECTIVE
No past medical history on file.  No past surgical history on file.  No family history on file.     Review of patient's allergies indicates:  No Known Allergies    Medications: I have reviewed the current medication administration record.    (Not in a hospital admission)      Review of Systems   Constitutional: Negative for chills and fever.   HENT: Negative for drooling.    Eyes: Negative for visual disturbance.   Respiratory: Negative for cough.    Cardiovascular: Negative for chest pain.   Gastrointestinal: Negative for nausea and vomiting.   Genitourinary: Negative for dysuria.   Musculoskeletal: Negative for arthralgias, back pain, myalgias and neck pain.   Skin: Negative for rash.   Neurological: Positive for facial asymmetry, speech difficulty and weakness. Negative for numbness.   Psychiatric/Behavioral: Negative for confusion.     Objective:     Vital Signs (Most Recent):  Temp: 97.5 °F (36.4 °C) (02/18/22 1749)  Pulse: (!) 113 (02/18/22 1749)  Resp: 15 (02/18/22 1749)  BP: (!) 166/90 (02/18/22 1749)  SpO2: 95 % (02/18/22 1749)    Vital Signs Range (Last 24H):  Temp:  [97.5 °F (36.4 °C)]   Pulse:  [113]   Resp:  [15]   BP: (166)/(90)   SpO2:  [95 %]     Physical Exam  Vitals reviewed.   Constitutional:       General: He is not in acute distress.     Appearance: He is well-developed and well-nourished.   HENT:      Head: Normocephalic and atraumatic.      Nose: Nose normal.   Eyes:      General: No scleral icterus.  Cardiovascular:      Rate and Rhythm: Normal rate.   Pulmonary:      Effort: Pulmonary effort is normal. No respiratory distress.   Abdominal:      General: There is no distension.   Musculoskeletal:      Cervical back: Normal range of motion.      Right lower leg: No edema.      Left lower leg: No edema.   Skin:     General: Skin is warm and dry.   Neurological:      Mental Status: He is alert.          Neurological Exam:   LOC: drowsy  Attention Span: poor  Language: No  aphasia  Articulation: Dysarthria  Orientation: Not oriented to place, Not oriented to time  Visual Fields: Visual neglect  EOM (CN III, IV, VI): Gaze preference  right  Pupils (CN II, III): PERRL  Facial Sensation (CN V): Normal  Facial Movement (CN VII): Lower facial weakness on the Left  Motor: Arm left  Plegia 0/5  Leg left  Plegia 0/5  Arm right  Normal 5/5  Leg right Normal 5/5  Sensation: Edy-hypoesthesia left      Laboratory:  CMP:   Recent Labs   Lab 02/18/22 1820   CALCIUM 8.7   ALBUMIN 3.4*   PROT 6.6      K 3.5   CO2 22*      BUN 16   CREATININE 0.7   ALKPHOS 46*   ALT 29   AST 23   BILITOT 0.7     CBC:   Recent Labs   Lab 02/18/22  1755   HCT 43     Lipid Panel:   Recent Labs   Lab 02/18/22  1820   CHOL 135   LDLCALC 88.4   HDL 31*   TRIG 78     Coagulation:   Recent Labs   Lab 02/18/22  1820   INR 1.1     Hgb A1C: No results for input(s): HGBA1C in the last 168 hours.  TSH:   Recent Labs   Lab 02/18/22  1820   TSH 1.540       Diagnostic Results:      Brain imaging:  MRI pending    Vessel Imaging:  CTA Multiphase. Date: 02/18/22  No hemorrhage  No early ischemic changes  R carotid/R M1 tandem occlusion    Cardiac Evaluation:   TTE pending

## 2022-02-20 LAB
ALBUMIN SERPL BCP-MCNC: 3.5 G/DL (ref 3.5–5.2)
ALP SERPL-CCNC: 57 U/L (ref 55–135)
ALT SERPL W/O P-5'-P-CCNC: 22 U/L (ref 10–44)
ANION GAP SERPL CALC-SCNC: 10 MMOL/L (ref 8–16)
AST SERPL-CCNC: 18 U/L (ref 10–40)
BASOPHILS # BLD AUTO: 0.03 K/UL (ref 0–0.2)
BASOPHILS NFR BLD: 0.3 % (ref 0–1.9)
BILIRUB SERPL-MCNC: 0.5 MG/DL (ref 0.1–1)
BUN SERPL-MCNC: 12 MG/DL (ref 6–20)
CALCIUM SERPL-MCNC: 9.1 MG/DL (ref 8.7–10.5)
CHLORIDE SERPL-SCNC: 105 MMOL/L (ref 95–110)
CO2 SERPL-SCNC: 23 MMOL/L (ref 23–29)
CREAT SERPL-MCNC: 0.8 MG/DL (ref 0.5–1.4)
DIFFERENTIAL METHOD: ABNORMAL
EOSINOPHIL # BLD AUTO: 0.1 K/UL (ref 0–0.5)
EOSINOPHIL NFR BLD: 1.4 % (ref 0–8)
ERYTHROCYTE [DISTWIDTH] IN BLOOD BY AUTOMATED COUNT: 13.2 % (ref 11.5–14.5)
EST. GFR  (AFRICAN AMERICAN): >60 ML/MIN/1.73 M^2
EST. GFR  (NON AFRICAN AMERICAN): >60 ML/MIN/1.73 M^2
GLUCOSE SERPL-MCNC: 144 MG/DL (ref 70–110)
HCT VFR BLD AUTO: 48.7 % (ref 40–54)
HGB BLD-MCNC: 16.1 G/DL (ref 14–18)
IMM GRANULOCYTES # BLD AUTO: 0.05 K/UL (ref 0–0.04)
IMM GRANULOCYTES NFR BLD AUTO: 0.5 % (ref 0–0.5)
LYMPHOCYTES # BLD AUTO: 2.1 K/UL (ref 1–4.8)
LYMPHOCYTES NFR BLD: 21.5 % (ref 18–48)
MAGNESIUM SERPL-MCNC: 1.9 MG/DL (ref 1.6–2.6)
MCH RBC QN AUTO: 29.1 PG (ref 27–31)
MCHC RBC AUTO-ENTMCNC: 33.1 G/DL (ref 32–36)
MCV RBC AUTO: 88 FL (ref 82–98)
MONOCYTES # BLD AUTO: 0.9 K/UL (ref 0.3–1)
MONOCYTES NFR BLD: 9 % (ref 4–15)
NEUTROPHILS # BLD AUTO: 6.4 K/UL (ref 1.8–7.7)
NEUTROPHILS NFR BLD: 67.3 % (ref 38–73)
NRBC BLD-RTO: 0 /100 WBC
PHOSPHATE SERPL-MCNC: 2.9 MG/DL (ref 2.7–4.5)
PLATELET # BLD AUTO: 170 K/UL (ref 150–450)
PMV BLD AUTO: 9.7 FL (ref 9.2–12.9)
POCT GLUCOSE: 103 MG/DL (ref 70–110)
POCT GLUCOSE: 104 MG/DL (ref 70–110)
POTASSIUM SERPL-SCNC: 3.5 MMOL/L (ref 3.5–5.1)
PROT SERPL-MCNC: 6.6 G/DL (ref 6–8.4)
RBC # BLD AUTO: 5.53 M/UL (ref 4.6–6.2)
SODIUM SERPL-SCNC: 138 MMOL/L (ref 136–145)
WBC # BLD AUTO: 9.54 K/UL (ref 3.9–12.7)

## 2022-02-20 PROCEDURE — 25000003 PHARM REV CODE 250

## 2022-02-20 PROCEDURE — 63600175 PHARM REV CODE 636 W HCPCS: Performed by: NURSE PRACTITIONER

## 2022-02-20 PROCEDURE — 63600175 PHARM REV CODE 636 W HCPCS: Performed by: STUDENT IN AN ORGANIZED HEALTH CARE EDUCATION/TRAINING PROGRAM

## 2022-02-20 PROCEDURE — 11000001 HC ACUTE MED/SURG PRIVATE ROOM

## 2022-02-20 PROCEDURE — 25000003 PHARM REV CODE 250: Performed by: NURSE PRACTITIONER

## 2022-02-20 PROCEDURE — 25000003 PHARM REV CODE 250: Performed by: PSYCHIATRY & NEUROLOGY

## 2022-02-20 PROCEDURE — 85025 COMPLETE CBC W/AUTO DIFF WBC: CPT

## 2022-02-20 PROCEDURE — 25000003 PHARM REV CODE 250: Performed by: STUDENT IN AN ORGANIZED HEALTH CARE EDUCATION/TRAINING PROGRAM

## 2022-02-20 PROCEDURE — 99233 SBSQ HOSP IP/OBS HIGH 50: CPT | Mod: ,,, | Performed by: PSYCHIATRY & NEUROLOGY

## 2022-02-20 PROCEDURE — 36415 COLL VENOUS BLD VENIPUNCTURE: CPT

## 2022-02-20 PROCEDURE — 80053 COMPREHEN METABOLIC PANEL: CPT

## 2022-02-20 PROCEDURE — 84100 ASSAY OF PHOSPHORUS: CPT

## 2022-02-20 PROCEDURE — 99233 PR SUBSEQUENT HOSPITAL CARE,LEVL III: ICD-10-PCS | Mod: ,,, | Performed by: PSYCHIATRY & NEUROLOGY

## 2022-02-20 PROCEDURE — 83735 ASSAY OF MAGNESIUM: CPT

## 2022-02-20 RX ADMIN — ATORVASTATIN CALCIUM 40 MG: 40 TABLET, FILM COATED ORAL at 09:02

## 2022-02-20 RX ADMIN — MUPIROCIN: 20 OINTMENT TOPICAL at 09:02

## 2022-02-20 RX ADMIN — ASPIRIN 81 MG CHEWABLE TABLET 81 MG: 81 TABLET CHEWABLE at 09:02

## 2022-02-20 RX ADMIN — HEPARIN SODIUM 5000 UNITS: 5000 INJECTION INTRAVENOUS; SUBCUTANEOUS at 02:02

## 2022-02-20 RX ADMIN — APIXABAN 5 MG: 5 TABLET, FILM COATED ORAL at 09:02

## 2022-02-20 RX ADMIN — HEPARIN SODIUM 5000 UNITS: 5000 INJECTION INTRAVENOUS; SUBCUTANEOUS at 06:02

## 2022-02-20 RX ADMIN — SENNOSIDES AND DOCUSATE SODIUM 1 TABLET: 50; 8.6 TABLET ORAL at 09:02

## 2022-02-20 RX ADMIN — HEPARIN SODIUM 5000 UNITS: 5000 INJECTION INTRAVENOUS; SUBCUTANEOUS at 09:02

## 2022-02-20 NOTE — SUBJECTIVE & OBJECTIVE
Neurologic Chief Complaint: R MCA syndrome     Subjective:     Interval History: Patient is seen for follow-up neurological assessment and treatment recommendations: patient doing well today with improvement of presenting symptoms. Patient found to have new onset a-fib. Plan to start eliquis tonight. Patient inquired and with no complaints today.     HPI, Past Medical, Family, and Social History remains the same as documented in the initial encounter.     Review of Systems: negative unless stated otherwise in the interval Hx   Scheduled Meds:   sodium chloride 0.9%  50 mL Intravenous Once    apixaban  5 mg Oral BID    aspirin  81 mg Oral Daily    atorvastatin  40 mg Oral Daily    heparin (porcine)  5,000 Units Subcutaneous Q8H    mupirocin   Nasal BID    senna-docusate 8.6-50 mg  1 tablet Oral BID     Continuous Infusions:  PRN Meds:acetaminophen, hydrALAZINE, iohexoL, labetalol, ondansetron, sodium chloride 0.9%, sodium chloride 0.9%, sodium chloride 0.9%    Objective:     Vital Signs (Most Recent):  Temp: 98.8 °F (37.1 °C) (02/20/22 1210)  Pulse: 85 (02/20/22 1210)  Resp: 17 (02/20/22 1210)  BP: 118/83 (02/20/22 1210)  SpO2: 95 % (02/20/22 1210)  BP Location: Left arm    Vital Signs Range (Last 24H):  Temp:  [97.8 °F (36.6 °C)-98.8 °F (37.1 °C)]   Pulse:  [77-98]   Resp:  [11-24]   BP: (112-155)/()   SpO2:  [89 %-99 %]   BP Location: Left arm    Physical Exam  Constitutional:       General: He is not in acute distress.     Appearance: He is not ill-appearing.   HENT:      Head: Normocephalic and atraumatic.      Nose: No congestion or rhinorrhea.      Mouth/Throat:      Mouth: Mucous membranes are moist.      Pharynx: Oropharynx is clear.   Eyes:      Extraocular Movements: Extraocular movements intact.      Pupils: Pupils are equal, round, and reactive to light.   Cardiovascular:      Rate and Rhythm: Normal rate.   Pulmonary:      Effort: No respiratory distress.   Abdominal:      General: There is no  distension.   Musculoskeletal:         General: No swelling.      Cervical back: No rigidity.   Skin:     Coloration: Skin is not jaundiced.       Neurological Exam:   LOC: alert  Attention Span: Good   Language: No aphasia  Articulation: No dysarthria  Orientation: Person, Place, Time   Visual Fields: Full  EOM (CN III, IV, VI): Full/intact  Pupils (CN II, III): PERRL  Facial Sensation (CN V): Normal  Facial Movement (CN VII): Symmetric facial expression    Reflexes: 2+ throughout  Motor: Arm left  Normal 5/5  Leg left  Normal 5/5  Arm right  Normal 5/5  Leg right Normal 5/5  Cerebellum: No evidence of appendicular or axial ataxia  Sensation: Intact to light touch, temperature and vibration  Tone: Normal tone throughout    Laboratory:  CMP:   Recent Labs   Lab 02/20/22  0257   CALCIUM 9.1   ALBUMIN 3.5   PROT 6.6      K 3.5   CO2 23      BUN 12   CREATININE 0.8   ALKPHOS 57   ALT 22   AST 18   BILITOT 0.5     CBC:   Recent Labs   Lab 02/20/22  0257   WBC 9.54   RBC 5.53   HGB 16.1   HCT 48.7      MCV 88   MCH 29.1   MCHC 33.1     Lipid Panel:   Recent Labs   Lab 02/18/22  1820   CHOL 135   LDLCALC 88.4   HDL 31*   TRIG 78     Coagulation:   Recent Labs   Lab 02/18/22  2238   INR 1.1   APTT 25.7     Hgb A1C:   Recent Labs   Lab 02/18/22 2238   HGBA1C 5.0     TSH:   Recent Labs   Lab 02/18/22  1820   TSH 1.540       Diagnostic Results     Brain imaging:  MRI Brain WO Contrast:   Acute infarct predominantly involving the right basal ganglia without evidence of significant mass effect or midline shift.  Microhemorrhage right putamen without evidence of large hemorrhagic conversion.     Vessel Imaging:  IR Angiogram Carotid Cerebral BL 2/18/22   Right cervical ICA occlusion with restoration of flow in the ICA and TICI 2 B flow in the right MCA territory on the first pass. There was a distal M2 occlusion which was aspirated on the second pass with 2C flow.        CTA Multiphase. Date: 02/18/22    No  hemorrhage  No early ischemic changes  Hyperdense R M1 sign   R carotid/R M1 tandem occlusion     Cardiac Evaluation:   TTE 2/19/22  The estimated ejection fraction is 55%.  The left ventricle is normal in size with normal systolic function.  Normal left ventricular diastolic function.  Normal right ventricular size with normal right ventricular systolic function.  Severe left atrial enlargement.  Normal central venous pressure (3 mmHg).

## 2022-02-20 NOTE — ASSESSMENT & PLAN NOTE
Andry Cantu is a 56 y.o. male who presented to ED with LSW, L facial droop, and dysarthria. CT head with hyperdense R MCA vessel sign and no early ischemic changes. Patient was treated with alteplase. CTA multiphase with tandem R carotid and  R M1 occlusions. Patient received tpa and then went for thrombectomy, TICI 2b. Patient was admitted to [facility]. TTE showed 55% EF, severe LAE, and normal function. Patient was noted to be in AF in ED and remains in AF in NCC. MRI pending. Therapy recommendations pending.     Etiology: Cardioembolic (new onset A Fib)     Antithrombotics for secondary stroke prevention: Antiplatelets: None; will start Eliquis 5 mg BID tonight       Statins for secondary stroke prevention and hyperlipidemia, if present:   Statins: Atorvastatin- 40 mg daily    Aggressive risk factor modification: None     Rehab efforts: The patient has been evaluated by a stroke team provider and the therapy needs have been fully considered based off the presenting complaints and exam findings. The following therapy evaluations are needed: PT evaluate and treat, OT evaluate and treat, SLP evaluate and treat, PM&R evaluate for appropriate placement--pending recommendations; Regular/Thin diet    Diagnostics ordered/pending: None     VTE prophylaxis: Mechanical prophylaxis: Place SCDs  Starting Eliquis and stopping heparin tonight    BP parameters: 48 hours s/p thrombectomy; okay for normotension SBP<160

## 2022-02-20 NOTE — PROGRESS NOTES
[facility] - Neurosurgery ([facility])  Vascular Neurology  Comprehensive Stroke Center  Progress Note    Assessment/Plan:     * Stroke due to embolism of right middle cerebral artery  Andry Cantu is a 56 y.o. male who presented to ED with LSW, L facial droop, and dysarthria. CT head with hyperdense R MCA vessel sign and no early ischemic changes. Patient was treated with alteplase. CTA multiphase with tandem R carotid and  R M1 occlusions. Patient received tpa and then went for thrombectomy, TICI 2b. Patient was admitted to [facility]. TTE showed 55% EF, severe LAE, and normal function. Patient was noted to be in AF in ED and remains in AF in NCC. MRI pending. Therapy recommendations pending.     Etiology: Cardioembolic (new onset A Fib)     Antithrombotics for secondary stroke prevention: Antiplatelets: None; will start Eliquis 5 mg BID tonight       Statins for secondary stroke prevention and hyperlipidemia, if present:   Statins: Atorvastatin- 40 mg daily    Aggressive risk factor modification: None     Rehab efforts: The patient has been evaluated by a stroke team provider and the therapy needs have been fully considered based off the presenting complaints and exam findings. The following therapy evaluations are needed: PT evaluate and treat, OT evaluate and treat, SLP evaluate and treat, PM&R evaluate for appropriate placement--pending recommendations; Regular/Thin diet    Diagnostics ordered/pending: None     VTE prophylaxis: Mechanical prophylaxis: Place SCDs  Starting Eliquis and stopping heparin tonight    BP parameters: 48 hours s/p thrombectomy; okay for normotension SBP<160        Essential hypertension  Stroke RF   Patient on losartan 12.5mg at home per patient's family         Paroxysmal atrial fibrillation  New Onset   Stroke risk factor.   Telemetry   Starting eliquis tonight   Discussed with patient and family          02/19/2022  Patient s/p tpa and TICI 2b following thrombectomy. He is doing well in [facility].  Patient able to move all extremities spontaneously. No aphasia appreciated on exam. He is alert and able to follow commands. EOMs intact. L facial droop present and mild LUE drift. Patient noted to be in AF on telemetry during exam. Discussed the need for anticoagulation in the near future with patient and family. TTE with EF 55%, normal function, and severe LAE. MRI pending.       STROKE DOCUMENTATION   Acute Stroke Times   Last Known Normal Date: 02/18/22  Last Known Normal Time: 1521  Symptom Onset Date: 02/18/22  Symptom Onset Time: 1530  Stroke Team Called Date: 02/18/22  Stroke Team Called Time: 1757  Stroke Team Arrival Date: 02/18/22  Stroke Team Arrival Time: 1800  CT Interpretation Time: 1813  Alteplase Recommended: Yes  CTA Interpretation Time: 1820  Thrombectomy Recommended: Yes  Decision to Treat Time for Alteplase: 1823  Decision to Treat Time for IR: 1847    NIH Scale:  1a. Level of Consciousness: 0-->Alert, keenly responsive  1b. LOC Questions: 0-->Answers both questions correctly  1c. LOC Commands: 0-->Performs both tasks correctly  2. Best Gaze: 0-->Normal  3. Visual: 0-->No visual loss  4. Facial Palsy: 1-->Minor paralysis (flattened nasolabial fold, asymmetry on smiling)  5a. Motor Arm, Left: 0-->No drift, limb holds 90 (or 45) degrees for full 10 secs  5b. Motor Arm, Right: 0-->No drift, limb holds 90 (or 45) degrees for full 10 secs  6a. Motor Leg, Left: 0-->No drift, leg holds 30 degree position for full 5 secs  6b. Motor Leg, Right: 0-->No drift, leg holds 30 degree position for full 5 secs  7. Limb Ataxia: 0-->Absent  8. Sensory: 0-->Normal, no sensory loss  9. Best Language: 0-->No aphasia, normal  10. Dysarthria: 0-->Normal  11. Extinction and Inattention (formerly Neglect): 0-->No abnormality  Total (NIH Stroke Scale): 1       Modified [place] Score: 0  [place] Coma Scale:    ABCD2 Score:    [id number] Score:3  HAS -BLED Score:   ICH Score:   Hunt & Tsai Classification:      Hemorrhagic  change of an Ischemic Stroke: Does this patient have an ischemic stroke with hemorrhagic changes? No     Neurologic Chief Complaint: R MCA syndrome     Subjective:     Interval History: Patient is seen for follow-up neurological assessment and treatment recommendations: patient doing well today with improvement of presenting symptoms. Patient found to have new onset a-fib. Plan to start eliquis tonight. Patient inquired and with no complaints today.     HPI, Past Medical, Family, and Social History remains the same as documented in the initial encounter.     Review of Systems: negative unless stated otherwise in the interval Hx   Scheduled Meds:   sodium chloride 0.9%  50 mL Intravenous Once    apixaban  5 mg Oral BID    aspirin  81 mg Oral Daily    atorvastatin  40 mg Oral Daily    heparin (porcine)  5,000 Units Subcutaneous Q8H    mupirocin   Nasal BID    senna-docusate 8.6-50 mg  1 tablet Oral BID     Continuous Infusions:  PRN Meds:acetaminophen, hydrALAZINE, iohexoL, labetalol, ondansetron, sodium chloride 0.9%, sodium chloride 0.9%, sodium chloride 0.9%    Objective:     Vital Signs (Most Recent):  Temp: 98.8 °F (37.1 °C) (02/20/22 1210)  Pulse: 85 (02/20/22 1210)  Resp: 17 (02/20/22 1210)  BP: 118/83 (02/20/22 1210)  SpO2: 95 % (02/20/22 1210)  BP Location: Left arm    Vital Signs Range (Last 24H):  Temp:  [97.8 °F (36.6 °C)-98.8 °F (37.1 °C)]   Pulse:  [77-98]   Resp:  [11-24]   BP: (112-155)/()   SpO2:  [89 %-99 %]   BP Location: Left arm    Physical Exam  Constitutional:       General: He is not in acute distress.     Appearance: He is not ill-appearing.   HENT:      Head: Normocephalic and atraumatic.      Nose: No congestion or rhinorrhea.      Mouth/Throat:      Mouth: Mucous membranes are moist.      Pharynx: Oropharynx is clear.   Eyes:      Extraocular Movements: Extraocular movements intact.      Pupils: Pupils are equal, round, and reactive to light.   Cardiovascular:      Rate and  Rhythm: Normal rate.   Pulmonary:      Effort: No respiratory distress.   Abdominal:      General: There is no distension.   Musculoskeletal:         General: No swelling.      Cervical back: No rigidity.   Skin:     Coloration: Skin is not jaundiced.       Neurological Exam:   LOC: alert  Attention Span: Good   Language: No aphasia  Articulation: No dysarthria  Orientation: Person, Place, Time   Visual Fields: Full  EOM (CN III, IV, VI): Full/intact  Pupils (CN II, III): PERRL  Facial Sensation (CN V): Normal  Facial Movement (CN VII): Symmetric facial expression    Reflexes: 2+ throughout  Motor: Arm left  Normal 5/5  Leg left  Normal 5/5  Arm right  Normal 5/5  Leg right Normal 5/5  Cerebellum: No evidence of appendicular or axial ataxia  Sensation: Intact to light touch, temperature and vibration  Tone: Normal tone throughout    Laboratory:  CMP:   Recent Labs   Lab 02/20/22  0257   CALCIUM 9.1   ALBUMIN 3.5   PROT 6.6      K 3.5   CO2 23      BUN 12   CREATININE 0.8   ALKPHOS 57   ALT 22   AST 18   BILITOT 0.5     CBC:   Recent Labs   Lab 02/20/22  0257   WBC 9.54   RBC 5.53   HGB 16.1   HCT 48.7      MCV 88   MCH 29.1   MCHC 33.1     Lipid Panel:   Recent Labs   Lab 02/18/22  1820   CHOL 135   LDLCALC 88.4   HDL 31*   TRIG 78     Coagulation:   Recent Labs   Lab 02/18/22  2238   INR 1.1   APTT 25.7     Hgb A1C:   Recent Labs   Lab 02/18/22  2238   HGBA1C 5.0     TSH:   Recent Labs   Lab 02/18/22  1820   TSH 1.540       Diagnostic Results     Brain imaging:  MRI Brain WO Contrast:   Acute infarct predominantly involving the right basal ganglia without evidence of significant mass effect or midline shift.  Microhemorrhage right putamen without evidence of large hemorrhagic conversion.     Vessel Imaging:  IR Angiogram Carotid Cerebral BL 2/18/22   Right cervical ICA occlusion with restoration of flow in the ICA and TICI 2 B flow in the right MCA territory on the first pass. There was a distal  M2 occlusion which was aspirated on the second pass with 2C flow.        CTA Multiphase. Date: 02/18/22    No hemorrhage  No early ischemic changes  Hyperdense R M1 sign   R carotid/R M1 tandem occlusion     Cardiac Evaluation:   TTE 2/19/22  · The estimated ejection fraction is 55%.  · The left ventricle is normal in size with normal systolic function.  · Normal left ventricular diastolic function.  · Normal right ventricular size with normal right ventricular systolic function.  · Severe left atrial enlargement.  · Normal central venous pressure (3 mmHg).        Darren Fields MD  Comprehensive Stroke Center  Department of Vascular Neurology   [facility] - Neurosurgery [facility])

## 2022-02-20 NOTE — PLAN OF CARE
Problem: Adult Inpatient Plan of Care  Goal: Plan of Care Review  Outcome: Ongoing, Progressing  Flowsheets (Taken 2/20/2022 4750)  Plan of Care Reviewed With:   patient   spouse  Goal: Patient-Specific Goal (Individualized)  Outcome: Ongoing, Progressing  Goal: Optimal Comfort and Wellbeing  Outcome: Ongoing, Progressing     Problem: Adjustment to Illness (Stroke, Ischemic/Transient Ischemic Attack)  Goal: Optimal Coping  Outcome: Ongoing, Progressing     Problem: Bowel Elimination Impaired (Stroke, Ischemic/Transient Ischemic Attack)  Goal: Effective Bowel Elimination  Outcome: Ongoing, Progressing     Problem: Cerebral Tissue Perfusion (Stroke, Ischemic/Transient Ischemic Attack)  Goal: Optimal Cerebral Tissue Perfusion  Outcome: Ongoing, Progressing    POC reviewed with the patient and they verbalized understanding. All comments and concerns addressed. Bed locked in lowest position with bed alarm set, call light within reach. Safety precautions maintained. VSS, see flowsheets. No events this shift. Will continue to monitor for changes to POC and clinical condition.

## 2022-02-20 NOTE — ASSESSMENT & PLAN NOTE
New Onset   Stroke risk factor.   Telemetry   Starting eliquis tonight   Discussed with patient and family

## 2022-02-20 NOTE — PROGRESS NOTES
Pt transferred from [id number] to  via wheelchair on tele with belongings and wife. Care assumed by ESME Galvez.

## 2022-02-20 NOTE — NURSING
Pt arrived to NPU via wheelchair and assisted into hospital bed room 909. Pt and spouse were oriented to unit, hospital room, use of call bell, and nursing staff. Vital sings are WNL at time of transfer. Initial assessment performed and pt is A&O x4 but very hard of hearing. Tele arrived on cont cardiac monitoring and denies chest pain or SOB. Pt able to move all extremities and denies numbness/tingling. Bed is in lowest/locked position, side rails up x2, call bell is within reach. Pt's spouse is at the bedside and all questions answered. Pt denies further needs at this time. Will continue to monitor.

## 2022-02-21 VITALS
BODY MASS INDEX: 37.22 KG/M2 | WEIGHT: 251.31 LBS | HEIGHT: 69 IN | OXYGEN SATURATION: 92 % | TEMPERATURE: 99 F | HEART RATE: 83 BPM | SYSTOLIC BLOOD PRESSURE: 140 MMHG | RESPIRATION RATE: 18 BRPM | DIASTOLIC BLOOD PRESSURE: 86 MMHG

## 2022-02-21 LAB
ALBUMIN SERPL BCP-MCNC: 3.4 G/DL (ref 3.5–5.2)
ALP SERPL-CCNC: 57 U/L (ref 55–135)
ALT SERPL W/O P-5'-P-CCNC: 23 U/L (ref 10–44)
ANION GAP SERPL CALC-SCNC: 11 MMOL/L (ref 8–16)
AST SERPL-CCNC: 21 U/L (ref 10–40)
BASOPHILS # BLD AUTO: 0.03 K/UL (ref 0–0.2)
BASOPHILS NFR BLD: 0.4 % (ref 0–1.9)
BILIRUB SERPL-MCNC: 0.5 MG/DL (ref 0.1–1)
BUN SERPL-MCNC: 13 MG/DL (ref 6–20)
CALCIUM SERPL-MCNC: 9.4 MG/DL (ref 8.7–10.5)
CHLORIDE SERPL-SCNC: 106 MMOL/L (ref 95–110)
CO2 SERPL-SCNC: 24 MMOL/L (ref 23–29)
CREAT SERPL-MCNC: 0.8 MG/DL (ref 0.5–1.4)
DIFFERENTIAL METHOD: NORMAL
EOSINOPHIL # BLD AUTO: 0.2 K/UL (ref 0–0.5)
EOSINOPHIL NFR BLD: 1.8 % (ref 0–8)
ERYTHROCYTE [DISTWIDTH] IN BLOOD BY AUTOMATED COUNT: 13.2 % (ref 11.5–14.5)
EST. GFR  (AFRICAN AMERICAN): >60 ML/MIN/1.73 M^2
EST. GFR  (NON AFRICAN AMERICAN): >60 ML/MIN/1.73 M^2
GLUCOSE SERPL-MCNC: 92 MG/DL (ref 70–110)
HCT VFR BLD AUTO: 46.9 % (ref 40–54)
HGB BLD-MCNC: 16 G/DL (ref 14–18)
IMM GRANULOCYTES # BLD AUTO: 0.02 K/UL (ref 0–0.04)
IMM GRANULOCYTES NFR BLD AUTO: 0.2 % (ref 0–0.5)
LYMPHOCYTES # BLD AUTO: 2.1 K/UL (ref 1–4.8)
LYMPHOCYTES NFR BLD: 25.5 % (ref 18–48)
MAGNESIUM SERPL-MCNC: 2 MG/DL (ref 1.6–2.6)
MCH RBC QN AUTO: 29.6 PG (ref 27–31)
MCHC RBC AUTO-ENTMCNC: 34.1 G/DL (ref 32–36)
MCV RBC AUTO: 87 FL (ref 82–98)
MONOCYTES # BLD AUTO: 0.8 K/UL (ref 0.3–1)
MONOCYTES NFR BLD: 10.3 % (ref 4–15)
NEUTROPHILS # BLD AUTO: 5.1 K/UL (ref 1.8–7.7)
NEUTROPHILS NFR BLD: 61.8 % (ref 38–73)
NRBC BLD-RTO: 0 /100 WBC
PHOSPHATE SERPL-MCNC: 3.5 MG/DL (ref 2.7–4.5)
PLATELET # BLD AUTO: 171 K/UL (ref 150–450)
PMV BLD AUTO: 9.5 FL (ref 9.2–12.9)
POCT GLUCOSE: 102 MG/DL (ref 70–110)
POTASSIUM SERPL-SCNC: 3.9 MMOL/L (ref 3.5–5.1)
PROT SERPL-MCNC: 6.7 G/DL (ref 6–8.4)
RBC # BLD AUTO: 5.41 M/UL (ref 4.6–6.2)
SODIUM SERPL-SCNC: 141 MMOL/L (ref 136–145)
WBC # BLD AUTO: 8.19 K/UL (ref 3.9–12.7)

## 2022-02-21 PROCEDURE — 25000003 PHARM REV CODE 250: Performed by: STUDENT IN AN ORGANIZED HEALTH CARE EDUCATION/TRAINING PROGRAM

## 2022-02-21 PROCEDURE — 85025 COMPLETE CBC W/AUTO DIFF WBC: CPT

## 2022-02-21 PROCEDURE — 36415 COLL VENOUS BLD VENIPUNCTURE: CPT

## 2022-02-21 PROCEDURE — 99222 1ST HOSP IP/OBS MODERATE 55: CPT | Mod: ,,, | Performed by: NURSE PRACTITIONER

## 2022-02-21 PROCEDURE — 97535 SELF CARE MNGMENT TRAINING: CPT

## 2022-02-21 PROCEDURE — 99233 PR SUBSEQUENT HOSPITAL CARE,LEVL III: ICD-10-PCS | Mod: ,,, | Performed by: PSYCHIATRY & NEUROLOGY

## 2022-02-21 PROCEDURE — 97530 THERAPEUTIC ACTIVITIES: CPT

## 2022-02-21 PROCEDURE — 92523 SPEECH SOUND LANG COMPREHEN: CPT

## 2022-02-21 PROCEDURE — 92526 ORAL FUNCTION THERAPY: CPT

## 2022-02-21 PROCEDURE — 83735 ASSAY OF MAGNESIUM: CPT

## 2022-02-21 PROCEDURE — 99222 PR INITIAL HOSPITAL CARE,LEVL II: ICD-10-PCS | Mod: ,,, | Performed by: NURSE PRACTITIONER

## 2022-02-21 PROCEDURE — 80053 COMPREHEN METABOLIC PANEL: CPT

## 2022-02-21 PROCEDURE — 84100 ASSAY OF PHOSPHORUS: CPT

## 2022-02-21 PROCEDURE — 25000003 PHARM REV CODE 250

## 2022-02-21 PROCEDURE — 25000003 PHARM REV CODE 250: Performed by: NURSE PRACTITIONER

## 2022-02-21 PROCEDURE — 99233 SBSQ HOSP IP/OBS HIGH 50: CPT | Mod: ,,, | Performed by: PSYCHIATRY & NEUROLOGY

## 2022-02-21 RX ORDER — LOSARTAN POTASSIUM 25 MG/1
12.5 TABLET ORAL DAILY
COMMUNITY

## 2022-02-21 RX ORDER — ATORVASTATIN CALCIUM 40 MG/1
40 TABLET, FILM COATED ORAL DAILY
Qty: 90 TABLET | Refills: 3 | Status: SHIPPED | OUTPATIENT
Start: 2022-02-22 | End: 2022-03-08 | Stop reason: SDUPTHER

## 2022-02-21 RX ADMIN — MUPIROCIN: 20 OINTMENT TOPICAL at 09:02

## 2022-02-21 RX ADMIN — APIXABAN 5 MG: 5 TABLET, FILM COATED ORAL at 08:02

## 2022-02-21 RX ADMIN — SENNOSIDES AND DOCUSATE SODIUM 1 TABLET: 50; 8.6 TABLET ORAL at 08:02

## 2022-02-21 RX ADMIN — ATORVASTATIN CALCIUM 40 MG: 40 TABLET, FILM COATED ORAL at 08:02

## 2022-02-21 RX ADMIN — ASPIRIN 81 MG CHEWABLE TABLET 81 MG: 81 TABLET CHEWABLE at 08:02

## 2022-02-21 NOTE — PLAN OF CARE
Problem: Adult Inpatient Plan of Care  Goal: Plan of Care Review  Outcome: Adequate for Care Transition  Goal: Patient-Specific Goal (Individualized)  Description: Admit Date: 2/18/22    Admit Dx: R MCA    HX: HTN    No past surgical history on file.    Individualization:   1. Pt [place]- needs hearing aids    Restraints: n/a         Outcome: Adequate for Care Transition  Goal: Absence of Hospital-Acquired Illness or Injury  Outcome: Adequate for Care Transition  Goal: Optimal Comfort and Wellbeing  Outcome: Adequate for Care Transition  Goal: Readiness for Transition of Care  Outcome: Adequate for Care Transition     Problem: Adjustment to Illness (Stroke, Ischemic/Transient Ischemic Attack)  Goal: Optimal Coping  Outcome: Adequate for Care Transition  Intervention: Support Psychosocial Response to Stroke  Flowsheets (Taken 2/21/2022 0160)  Supportive Measures:   active listening utilized   counseling provided   decision-making supported   relaxation techniques promoted   self-care encouraged   self-reflection promoted   self-responsibility promoted   verbalization of feelings encouraged   positive reinforcement provided  Family/Support System Care:   support provided   self-care encouraged   presence promoted   involvement promoted     Problem: Cerebral Tissue Perfusion (Stroke, Ischemic/Transient Ischemic Attack)  Goal: Optimal Cerebral Tissue Perfusion  Outcome: Adequate for Care Transition  Intervention: Protect and Optimize Cerebral Perfusion  Flowsheets (Taken 2/21/2022 1537)  Sensory Stimulation Regulation:   care clustered   lighting decreased   quiet environment promoted   tactile stimulation provided   visual stimulation provided  Cerebral Perfusion Promotion: blood pressure monitored  Fluid/Electrolyte Management: fluids provided  Stabilization Measures: airway opened     Problem: Functional Ability Impaired (Stroke, Ischemic/Transient Ischemic Attack)  Goal: Optimal Functional  Ability  Outcome: Adequate for Care Transition     PT AOX4. POC reviewed with pt and spouse. They verbalized understanding. Safety precautions In place. Discharge instructions reviewed, questions asked and answered. Pt and spouse verbalized understanding.

## 2022-02-21 NOTE — PLAN OF CARE
Rishi Iglesias - Neurosurgery ([facility])  Discharge Final Note    Primary Care Provider: Carlos Manuel Henderson MD  Expected Discharge Date: 2/21/2022    Patient medically ready for discharge to home with family and home health (referral to [facility]).  Nurse can call report to NA.  Transportation provided per spouse.   Is family/patient aware of discharge yes - patient and spouse.  Hospital follow up scheduled, per CM.  Vascular Neurology to schedule follow up if necessary.  Final Discharge Note (most recent)     Final Note - 02/21/22 1344        Final Note    Assessment Type Final Discharge Note     Anticipated Discharge Disposition [place] [facility]     Hospital Resources/Appts/Education Provided Appointments scheduled and added to AVS        Post-Acute Status    Post-Acute Authorization Home Health     Home Health Status Referrals Sent     Discharge Delays None known at this time                 Important Message from Medicare         Referral Info (most recent)     Referral Info    No documentation.               Contact Info     Carlos Manuel Henderson MD   Specialty: Endocrinology   Relationship: PCP - General    [address] [id number]   Phone: [phone]       Next Steps: Go in 2 week(s)        No future appointments.  Naomi Dunn RN  Case Management  Ext: [phone]  02/21/2022    Rishi Iglesias - Neurosurgery ([facility])  Discharge Assessment    Primary Care Provider: Carlos Manuel Henderson MD   CM met with patient and spouse at bedside to complete Discharge Planning Assessment.  Patient is potentially discharging today.  Discharge Planning Booklet provided to patient and all questions answered.    Discharge Assessment (most recent)     BRIEF DISCHARGE ASSESSMENT - 02/21/22 1343        Discharge Planning    Assessment Type Discharge Planning Brief Assessment     Resource/Environmental Concerns home accessibility     Home Accessibility Concerns stairs to enter home   SS w/ 2 steps    Support Systems Spouse/significant  other     Equipment Currently Used at Home none     Current Living Arrangements home/apartment/condo     Patient/Family Anticipates Transition to home with family     Patient/Family Anticipated Services at Transition home health care     DME Needed Upon Discharge  none     Discharge Plan A Home with family;Home Health     Discharge Plan B Home with family               Payor: BLUE CROSS BLUE [employer]IELD / Plan: [place] PPO / Product Type: PPO /      [id number] [place] [address] [facility] [address] [address]  Phone: [phone] Fax: [phone]     Extended Emergency Contact Information  Primary Emergency Contact: Checo Cantu  Mobile Phone: [phone]  Relation: Spouse   Naomi Dunn RN  Case Management  Ext: [phone]

## 2022-02-21 NOTE — PLAN OF CARE
Goals remain appropriate.  Problem: Occupational Therapy Goal  Goal: Occupational Therapy Goal  Description: Goals to be met by: 3/7/2022     Patient will increase functional independence with ADLs by performing:    Feeding with [place].  UE Dressing with Modified [place].  LE Dressing with Modified [place].  Grooming while standing at sink with Modified [place].  Toileting from toilet with Modified [place] for hygiene and clothing management.   Toilet transfer to toilet with Modified [place].    Outcome: Ongoing, Progressing

## 2022-02-21 NOTE — SUBJECTIVE & OBJECTIVE
Past Medical History:   Diagnosis Date    Essential hypertension 2/18/2022    Paroxysmal atrial fibrillation 2/18/2022     No past surgical history on file.  Review of patient's allergies indicates:  No Known Allergies    Scheduled Medications:    apixaban  5 mg Oral BID    aspirin  81 mg Oral Daily    atorvastatin  40 mg Oral Daily    mupirocin   Nasal BID    senna-docusate 8.6-50 mg  1 tablet Oral BID       PRN Medications: acetaminophen, hydrALAZINE, iohexoL, labetalol, ondansetron, sodium chloride 0.9%, sodium chloride 0.9%, sodium chloride 0.9%    Family History    None       Tobacco Use    Smoking status: Not on file    Smokeless tobacco: Not on file   Substance and Sexual Activity    Alcohol use: Not on file    Drug use: Not on file    Sexual activity: Not on file     Review of Systems   Constitutional:  Positive for activity change. Negative for fatigue and fever.   HENT:  Negative for trouble swallowing and voice change.    Respiratory:  Negative for cough and shortness of breath.    Cardiovascular:  Negative for chest pain and leg swelling.   Gastrointestinal:  Negative for abdominal distention and abdominal pain.   Genitourinary:  Negative for difficulty urinating and flank pain.   Musculoskeletal:  Negative for back pain and gait problem.   Skin:  Negative for color change and rash.   Neurological:  Positive for weakness (improved). Negative for speech difficulty and numbness.   Psychiatric/Behavioral:  Negative for agitation and confusion.    Objective:     Vital Signs (Most Recent):  Temp: 98.5 °F (36.9 °C) (02/21/22 1143)  Pulse: 94 (02/21/22 1143)  Resp: 18 (02/21/22 1143)  BP: (!) 140/86 (02/21/22 1143)  SpO2: (!) 92 % (02/21/22 1143)      Vital Signs (24h Range):  Temp:  [96.8 °F (36 °C)-98.9 °F (37.2 °C)] 98.5 °F (36.9 °C)  Pulse:  [] 94  Resp:  [16-20] 18  SpO2:  [90 %-94 %] 92 %  BP: (128-151)/(81-98) 140/86     Body mass index is 37.11 kg/m².    Physical Exam  Vitals and nursing note  reviewed.   Constitutional:       Appearance: He is well-developed.   HENT:      Head: Normocephalic and atraumatic.   Eyes:      General:         Right eye: No discharge.         Left eye: No discharge.      Pupils: Pupils are equal, round, and reactive to light.   Pulmonary:      Effort: Pulmonary effort is normal. No respiratory distress.   Abdominal:      General: There is no distension.      Palpations: Abdomen is soft.      Tenderness: There is no abdominal tenderness.   Musculoskeletal:         General: No deformity. Normal range of motion.      Cervical back: Neck supple.   Skin:     General: Skin is warm and dry.   Neurological:      Sensory: No sensory deficit.      Motor: No abnormal muscle tone.   Psychiatric:         Behavior: Behavior normal.     NEUROLOGICAL EXAMINATION:     CRANIAL NERVES     CN III, IV, VI   Pupils are equal, round, and reactive to light.    Diagnostic Results: Labs: Reviewed  ECG: Reviewed  CT: Reviewed

## 2022-02-21 NOTE — CONSULTS
Inpatient consult to Physical Medicine Rehab  Consult performed by: Diandra Rey NP  Consult ordered by: Roopa Meza PA-C  Reason for consult: Assess rehab needs      Reviewed patient history and current admission.  Rehab team following.  Full consult to follow.    ZOFIA Martinez, FNP-C  Physical Medicine & Rehabilitation   02/21/2022

## 2022-02-21 NOTE — PT/OT/SLP PROGRESS
Physical Therapy Treatment       Patient Name:  Andry Cantu   MRN:  [id number]    PT presented to pt room following documentation for pt preparation to discharge today. Pt resting in bed, reports he feels he is now close to his baseline, is able to perform mobility and ambulate without assistance. Spouse corroborates this. Spouse and pt with multiple questions regarding process to set up home health services. PT provided education regarding process for recommendation, case management, and process for home health services to reach our for evaluation. Pt and spouse encouraged to make a list of goals for home health to address. Spouse asking what pt can do before evaluation, PT encouraged UE exercises as prescribed by OT, and to perform multiple scheduled walks daily to continue to improve independence with ambulation. Spouse and pt reported understanding. No further questions at this time. Pt would continue to benefit from acute skilled therapy intervention to address deficits and progress toward prior level of function.     PT Start Time: 1426  PT Stop Time: 1436  Total Time: 10 mins   Total Billable Units: Therapeutic Activity 10 mins

## 2022-02-21 NOTE — DISCHARGE SUMMARY
Rishi Iglesias - Neurosurgery ([facility])  Vascular Neurology  Comprehensive Stroke Center  Discharge Summary     Summary:     Admit Date: 2/18/2022  5:48 PM    Discharge Date and Time:  02/21/2022 4:20 PM    Attending Physician: Josh Jimenez MD     Discharge Provider: Mukesh Kuhn MD    History of Present Illness: Andry Cantu is a 56 y.o. male with PMHx of HTN and new onset Afib (on telemetry) who presented to ED from home with LSW, dysarthria, and L facial droop. History obtained via patients wife and EMR.    Last known normal 02/18/2022 at 15:30. Wife phoned patient at 15:21 while driving home and noted the patient was speaking normally over telephone. When she arrived at 15:30 she notes the patient was dysarthric and not moving his extremities on the left. Wife phoned EMS and patient was transferred to [facility]. On transfer patient was hypertensive (160/90s) with blood glucose in the 90's. On arrival stroke code was activated and patient was rushed to CT. CTA stroke multiphase notable for hyperdense right MCA and right MCA M1 occlusion. Of note, patient recently diagnosed with COVID 3 weeks prior.       Hospital Course (synopsis of major diagnoses, care, treatment, and services provided during the course of the hospital stay): Pt presented to ED with LSW, L facial droop, and dysarthria. CT head with hyperdense R MCA vessel sign and no early ischemic changes. CTA multiphase with tandem R carotid and R M1 occlusions. Patient received tpa and then went for thrombectomy: Right cervical ICA occlusion with restoration of flow in the ICA with TICI 2 B flow in the right MCA territory on the first pass followed by aspiration of distal M2 occlusion on second pass with TICI 2C flow. Patient was admitted to [facility] and later stepped down to the Vascular Neurology service following post-tpa monitoring window.  Patient was noted to be in Afib in ED and remained in Afib during course of admission. TTE showed 55% EF, severe  LAE, and without evidence of intracardiac thrombus. MRI brain WO contrast showed acute infarct predominantly involving the right basal ganglia without evidence of significant mass effect or midline shift; microhemorrhage right putamen without evidence of large hemorrhagic conversion. Etiology of CVA thought be cardioembolic in the setting of new-onset Afib. Patient experienced near complete resolution of symptoms post tx, NIHSS 1 post-therapy and progressing to NIHSS 0 at time of discharge. Started on Eliquis 5 mg BID on 2/20 for thromboembolic prophylaxis in setting of Afib; continued on discharge. No rate control required while IP. Lipitor 40 mg daily continued on discharge. Continued on home losartan 12.5 mg on discharge for BP control. Recommendations made for Home PT with referrals made on discharge. Instructed to follow-up with PCP within 2 weeks of discharge. To follow-up with Vascular neurology within 4-6 weeks. At time of departure patient hemodynamically stable, tolerating PO intake without issue and without acute complaints.    Close return instructions given to patient at time of discharge, patient voiced understanding. See Assessment and Plan from last day of admission's progress note for further details.             Goals of Care Treatment Preferences:  Code Status: Full Code      Stroke Etiology: Ischemic Cardioembolic Atrial fibrillation    STROKE DOCUMENTATION   Acute Stroke Times   Last Known Normal Date: 02/18/22  Last Known Normal Time: 1521  Symptom Onset Date: 02/18/22  Symptom Onset Time: 1530  Stroke Team Called Date: 02/18/22  Stroke Team Called Time: 1757  Stroke Team Arrival Date: 02/18/22  Stroke Team Arrival Time: 1800  CT Interpretation Time: 1813  Alteplase Recommended: Yes  CTA Interpretation Time: 1820  Thrombectomy Recommended: Yes  Decision to Treat Time for Alteplase: 1823  Decision to Treat Time for IR: 1847     NIH Scale:  1a. Level of Consciousness: 0-->Alert, keenly  responsive  1b. LOC Questions: 0-->Answers both questions correctly  1c. LOC Commands: 0-->Performs both tasks correctly  2. Best Gaze: 0-->Normal  3. Visual: 0-->No visual loss  4. Facial Palsy: 0-->Normal symmetrical movements  5a. Motor Arm, Left: 0-->No drift, limb holds 90 (or 45) degrees for full 10 secs  5b. Motor Arm, Right: 0-->No drift, limb holds 90 (or 45) degrees for full 10 secs  6a. Motor Leg, Left: 0-->No drift, leg holds 30 degree position for full 5 secs  6b. Motor Leg, Right: 0-->No drift, leg holds 30 degree position for full 5 secs  7. Limb Ataxia: 0-->Absent  8. Sensory: 0-->Normal, no sensory loss  9. Best Language: 0-->No aphasia, normal  10. Dysarthria: 0-->Normal  11. Extinction and Inattention (formerly Neglect): 0-->No abnormality  Total (NIH Stroke Scale): 0        Modified Bucky Score: 0  Duchesne Coma Scale:    ABCD2 Score:    [id number] Score:3  HAS -BLED Score:   ICH Score:   Hunt & Tsai Classification:       Assessment/Plan:     Diagnostic Results:    Brain imaging:  MRI Brain WO Contrast 2/19/22:   Acute infarct predominantly involving the right basal ganglia without evidence of significant mass effect or midline shift.  Microhemorrhage right putamen without evidence of large hemorrhagic conversion.     Vessel Imaging:  IR Angiogram Carotid Cerebral BL 2/18/22   Right cervical ICA occlusion with restoration of flow in the ICA and TICI 2 B flow in the right MCA territory on the first pass. There was a distal M2 occlusion which was aspirated on the second pass with 2C flow.        CTA Multiphase. Date: 02/18/22    No hemorrhage  No early ischemic changes  Hyperdense R M1 sign   R carotid/R M1 tandem occlusion     Cardiac Evaluation:   TTE 2/19/22  · The estimated ejection fraction is 55%.  · The left ventricle is normal in size with normal systolic function.  · Normal left ventricular diastolic function.  · Normal right ventricular size with normal right ventricular systolic  function.  · Severe left atrial enlargement.  · Normal central venous pressure (3 mmHg).         Interventions: IV t-PA, Thrombectomy    Complications: None    [place] [facility]    Final Active Diagnoses:    Diagnosis Date Noted POA    PRINCIPAL PROBLEM:  Stroke due to embolism of right middle cerebral artery [I63.411] 02/18/2022 Yes    Paroxysmal atrial fibrillation [I48.0] 02/18/2022 Yes    Essential hypertension [I10] 02/18/2022 Yes      Problems Resolved During this Admission:     No new Assessment & Plan notes have been filed under this hospital service since the last note was generated.  Service: Vascular Neurology      Recommendations:     Post-discharge complication risks: None    Stroke Education given to: patient and family    Follow-up in Stroke Clinic in 4-6 weeks.     Discharge Plan:  Statin: Atorvastatin 40 mg  Anticoagulant: Apixaban 5mg  Aggresive risk factor modification:  Hypertension  Diet  Exercise  Obesity  Atrial Fibrillation    Follow Up:   Follow-up Information     Carlos Manuel Henderson MD. Go in 2 week(s).    Specialty: Endocrinology  Contact information:  [facility] [facility]  [place] [id number] [phone]                         Patient Instructions:      Ambulatory referral/consult to Vascular Neurology   Standing Status: Future   Referral Priority: Routine Referral Type: Consultation   Referral Reason: Specialty Services Required   Requested Specialty: Vascular Neurology     Ambulatory referral/consult to Home Health   Standing Status: Future   Referral Priority: Routine Referral Type: Home Health   Referral Reason: Specialty Services Required   Requested Specialty: Home Health Services   Number of Visits Requested: 1     Diet Cardiac     Notify your health care provider if you experience any of the following:  severe persistent headache     Notify your health care provider if you experience any of the following:  persistent dizziness, light-headedness, or visual disturbances      Notify your health care provider if you experience any of the following:  increased confusion or weakness     Activity as tolerated       Medications:  Reconciled Home Medications:      Medication List      START taking these medications    atorvastatin 40 MG tablet  Commonly known as: LIPITOR  Take 1 tablet (40 mg total) by mouth once daily.  Start taking on: February 22, 2022     ELIQUIS 5 mg Tab  Generic drug: apixaban  Take 1 tablet (5 mg total) by mouth 2 (two) times daily.        CONTINUE taking these medications    losartan 25 MG tablet  Commonly known as: COZAAR  Take 12.5 mg by mouth once daily.            Mukesh Kuhn MD  Comprehensive Stroke Center  Department of Vascular Neurology   [facility])

## 2022-02-21 NOTE — PROGRESS NOTES
[facility]ias - Neurosurgery ([facility])  Vascular Neurology  Comprehensive Stroke Center  Progress Note    Assessment/Plan:     * Stroke due to embolism of right middle cerebral artery  Andry Cantu is a 56 y.o. male who presented to ED with LSW, L facial droop, and dysarthria. CT head with hyperdense R MCA vessel sign and no early ischemic changes. Patient was treated with alteplase. CTA multiphase with tandem R carotid and  R M1 occlusions. Patient received tpa and then went for thrombectomy, TICI 2b. Patient was admitted to [facility]. TTE showed 55% EF, severe LAE, and normal function. Patient was noted to be in AF in ED and remains in AF in NCC. MRI with Acute infarct predominantly involving the right basal ganglia without evidence of significant mass effect or midline shift.  Microhemorrhage right putamen without evidence of large hemorrhagic conversion. Echo with preserved EF and without evidence of intracardiac thrombus. Near complete resolution of symptoms post tx, NIHSS 1 post-therapy. Recommendations for Home PT on discharge    Etiology: Cardioembolic (new onset A Fib)     Antithrombotics for secondary stroke prevention: Antiplatelets: None; Cont Eliquis 5mg BID      Statins for secondary stroke prevention and hyperlipidemia, if present:   Statins: Atorvastatin- 40 mg daily    Aggressive risk factor modification: None     Rehab efforts: The patient has been evaluated by a stroke team provider and the therapy needs have been fully considered based off the presenting complaints and exam findings. The following therapy evaluations are needed: PT evaluate and treat, OT evaluate and treat, SLP evaluate and treat, PM&R evaluate for appropriate placement--pending recommendations; Regular/Thin diet    Diagnostics ordered/pending: None     VTE prophylaxis: Mechanical prophylaxis: Place SCDs  Cont Eliquis    BP parameters: 48 hours s/p thrombectomy; okay for normotension SBP<160        Essential hypertension  Stroke RF    Patient on losartan 12.5mg at home per patient's family         Paroxysmal atrial fibrillation  New Onset   Stroke risk factor.   Telemetry   Cont Eliquis 5 mg BID           STROKE DOCUMENTATION   Acute Stroke Times   Last Known Normal Date: 02/18/22  Last Known Normal Time: 1521  Symptom Onset Date: 02/18/22  Symptom Onset Time: 1530  Stroke Team Called Date: 02/18/22  Stroke Team Called Time: 1757  Stroke Team Arrival Date: 02/18/22  Stroke Team Arrival Time: 1800  CT Interpretation Time: 1813  Alteplase Recommended: Yes  CTA Interpretation Time: 1820  Thrombectomy Recommended: Yes  Decision to Treat Time for Alteplase: 1823  Decision to Treat Time for IR: 1847    NIH Scale:  1a. Level of Consciousness: 0-->Alert, keenly responsive  1b. LOC Questions: 0-->Answers both questions correctly  1c. LOC Commands: 0-->Performs both tasks correctly  2. Best Gaze: 0-->Normal  3. Visual: 0-->No visual loss  4. Facial Palsy: 1-->Minor paralysis (flattened nasolabial fold, asymmetry on smiling)  5a. Motor Arm, Left: 0-->No drift, limb holds 90 (or 45) degrees for full 10 secs  5b. Motor Arm, Right: 0-->No drift, limb holds 90 (or 45) degrees for full 10 secs  6a. Motor Leg, Left: 0-->No drift, leg holds 30 degree position for full 5 secs  6b. Motor Leg, Right: 0-->No drift, leg holds 30 degree position for full 5 secs  7. Limb Ataxia: 0-->Absent  8. Sensory: 0-->Normal, no sensory loss  9. Best Language: 0-->No aphasia, normal  10. Dysarthria: 0-->Normal  11. Extinction and Inattention (formerly Neglect): 0-->No abnormality  Total (NIH Stroke Scale): 1         Modified [place] Score: 0  [place] Coma Scale:    ABCD2 Score:    [id number] Score:3  HAS -BLED Score:   ICH Score:   Hunt & Tsai Classification:      Hemorrhagic change of an Ischemic Stroke: Does this patient have an ischemic stroke with hemorrhagic changes? No     Neurologic Chief Complaint: R MCA syndrome    Subjective:     Interval History: Patient is seen for  follow-up neurological assessment and treatment recommendations: Pt seen and examined. Neuro exam stable. Started on Eliquis yesterday evening. Hb stable. Stable for discharge today with Home PT    HPI, Past Medical, Family, and Social History remains the same as documented in the initial encounter.     Review of Systems   Constitutional:  Negative for activity change, chills, fatigue and fever.   HENT:  Negative for congestion, rhinorrhea, sinus pressure, sinus pain and sore throat.    Eyes:  Negative for visual disturbance.   Respiratory:  Negative for cough, chest tightness, shortness of breath and wheezing.    Cardiovascular:  Negative for chest pain, palpitations and leg swelling.   Gastrointestinal:  Negative for abdominal pain, blood in stool, constipation, diarrhea, nausea and vomiting.   Endocrine: Negative for polydipsia, polyphagia and polyuria.   Genitourinary:  Negative for dysuria, frequency and hematuria.   Musculoskeletal:  Negative for arthralgias and myalgias.   Neurological:  Negative for dizziness, syncope, weakness and light-headedness.   Psychiatric/Behavioral:  Negative for agitation.     Scheduled Meds:   apixaban  5 mg Oral BID    aspirin  81 mg Oral Daily    atorvastatin  40 mg Oral Daily    mupirocin   Nasal BID    senna-docusate 8.6-50 mg  1 tablet Oral BID     Continuous Infusions:  PRN Meds:acetaminophen, hydrALAZINE, iohexoL, labetalol, ondansetron, sodium chloride 0.9%, sodium chloride 0.9%, sodium chloride 0.9%    Objective:     Vital Signs (Most Recent):  Temp: 98.5 °F (36.9 °C) (02/21/22 1143)  Pulse: 94 (02/21/22 1143)  Resp: 18 (02/21/22 1143)  BP: (!) 140/86 (02/21/22 1143)  SpO2: (!) 92 % (02/21/22 1143)  BP Location: Left arm    Vital Signs Range (Last 24H):  Temp:  [96.8 °F (36 °C)-98.9 °F (37.2 °C)]   Pulse:  []   Resp:  [16-20]   BP: (118-151)/(81-98)   SpO2:  [90 %-95 %]   BP Location: Left arm    Physical Exam  Constitutional:       General: He is not in acute  distress.     Appearance: He is not ill-appearing.   HENT:      Head: Normocephalic and atraumatic.      Nose: No congestion or rhinorrhea.      Mouth/Throat:      Mouth: Mucous membranes are moist.      Pharynx: Oropharynx is clear.   Eyes:      Extraocular Movements: Extraocular movements intact.      Pupils: Pupils are equal, round, and reactive to light.   Cardiovascular:      Rate and Rhythm: Normal rate.   Pulmonary:      Effort: No respiratory distress.   Abdominal:      General: There is no distension.   Musculoskeletal:         General: No swelling.      Cervical back: No rigidity.   Skin:     Coloration: Skin is not jaundiced.       Neurological Exam:   LOC: alert  Attention Span: Good   Language: No aphasia  Articulation: No dysarthria  Orientation: Person, Place, Time   Visual Fields: Full  EOM (CN III, IV, VI): Full/intact  Pupils (CN II, III): PERRL  Facial Sensation (CN V): Normal  Facial Movement (CN VII): Symmetric facial expression    Reflexes: 2+ throughout  Motor: Arm left  Normal 5/5  Leg left  Normal 5/5  Arm right  Normal 5/5  Leg right Normal 5/5  Cerebellum: No evidence of appendicular or axial ataxia  Sensation: Intact to light touch, temperature and vibration  Tone: Normal tone throughout    Laboratory:  CMP:   Recent Labs   Lab 02/21/22  0413   CALCIUM 9.4   ALBUMIN 3.4*   PROT 6.7      K 3.9   CO2 24      BUN 13   CREATININE 0.8   ALKPHOS 57   ALT 23   AST 21   BILITOT 0.5     CBC:   Recent Labs   Lab 02/21/22  0413   WBC 8.19   RBC 5.41   HGB 16.0   HCT 46.9      MCV 87   MCH 29.6   MCHC 34.1       Diagnostic Results     Brain imaging:  MRI Brain WO Contrast:   Acute infarct predominantly involving the right basal ganglia without evidence of significant mass effect or midline shift.  Microhemorrhage right putamen without evidence of large hemorrhagic conversion.     Vessel Imaging:  IR Angiogram Carotid Cerebral BL 2/18/22   Right cervical ICA occlusion with  restoration of flow in the ICA and TICI 2 B flow in the right MCA territory on the first pass. There was a distal M2 occlusion which was aspirated on the second pass with 2C flow.        CTA Multiphase. Date: 02/18/22    No hemorrhage  No early ischemic changes  Hyperdense R M1 sign   R carotid/R M1 tandem occlusion     Cardiac Evaluation:   TTE 2/19/22  · The estimated ejection fraction is 55%.  · The left ventricle is normal in size with normal systolic function.  · Normal left ventricular diastolic function.  · Normal right ventricular size with normal right ventricular systolic function.  · Severe left atrial enlargement.  · Normal central venous pressure (3 mmHg).         Mukesh Kuhn MD  Comprehensive Stroke Center  Department of Vascular Neurology   [facility])

## 2022-02-21 NOTE — PT/OT/SLP PROGRESS
"Occupational Therapy   Treatment    Name: Andry Cantu  MRN: [id number]  Admitting Diagnosis:  Stroke due to embolism of right middle cerebral artery       Recommendations:     Discharge Recommendations: home  Discharge Equipment Recommendations:  none  Barriers to discharge:  None    Assessment:     Andry Cantu is a 56 y.o. male with a medical diagnosis of Stroke due to embolism of right middle cerebral artery.  He presents with performance deficits affecting function are weakness, impaired endurance, impaired self care skills, impaired functional mobilty.     Rehab Prognosis:  Good; patient would benefit from acute skilled OT services to address these deficits and reach maximum level of function.       Plan:     Patient to be seen 3 x/week to address the above listed problems via self-care/home management, therapeutic activities, therapeutic exercises, neuromuscular re-education  · Plan of Care Expires: 03/19/22  · Plan of Care Reviewed with: patient, spouse    Subjective   Patient:  "I couldn't get my words into sentences when this happened."  "I don't find I am having any trouble."  Wife:  "Everything is so much better."   Pain/Comfort:  · Pain Rating 1: 0/10  · Pain Rating Post-Intervention 1: 0/10    Objective:     Communicated with: Nurse prior to session.  Patient found supine with bed alarm, telemetry, peripheral IV upon OT entry to room.    General Precautions: Standard, aspiration, fall, hearing impaired   Orthopedic Precautions:N/A   Braces: N/A  Respiratory Status: Room air     Occupational Performance:     Bed Mobility:    · Patient completed Rolling/Turning to Left with  modified independence  · Patient completed Rolling/Turning to Right with modified independence  · Patient completed Supine to Sit with modified independence  · Patient completed Sit to Supine with modified independence     Functional Mobility/Transfers:  · Patient completed Sit <> Stand Transfer with modified independence  with  no " assistive device   · Patient completed Toilet Transfer Stand Pivot technique with modified independence with  no AD    Activities of Daily Living:  · Grooming: modified independence while standing  · Upper Body Dressing: modified independence while seated EOB  · Lower Body Dressing: modified independence while seated EOB  · Toileting: modified independence with use of std commode    AMPAC 6 Click ADL: 24    Treatment & Education:  Patient alert and oriented x 3; able to follow 4/4 one step commands.  Patient attentive and interactive throughout the session.  Able to tie laces, able to manipulate phone for sending text messages.     Patient left supine with all lines intact, call button in reach and bed alarm onEducation:      GOALS:   Multidisciplinary Problems     Occupational Therapy Goals        Problem: Occupational Therapy Goal    Goal Priority Disciplines Outcome Interventions   Occupational Therapy Goal     OT, PT/OT Ongoing, Progressing    Description: Goals to be met by: 3/7/2022     Patient will increase functional independence with ADLs by performing:    Feeding with [place].  UE Dressing with Modified [place].  LE Dressing with Modified [place].  Grooming while standing at sink with Modified [place].  Toileting from toilet with Modified [place] for hygiene and clothing management.   Toilet transfer to toilet with Modified [place].                     Time Tracking:     OT Date of Treatment: 02/21/22  OT Start Time: 0650  OT Stop Time: 0719  OT Total Time (min): 29 min    Billable Minutes:Self Care/Home Management 29    OT/RIZWAN: OT          2/21/2022

## 2022-02-21 NOTE — HPI
Andry Cantu is a 56-year-old male with PMHx of HTN, Afib. Patient presented to [facility] on 2/18 for LSW. On arrival, CTH revealed R MCA. MRI with Acute infarct predominantly involving the right basal ganglia without evidence of significant mass effect or midline shift.  Microhemorrhage right putamen without evidence of large hemorrhagic conversion.     Functional History: Patient lives with wife.  Prior to admission, I DME: none.

## 2022-02-21 NOTE — CONSULTS
[facility] Iglesias - Neurosurgery ([facility])  Physical Medicine & Rehab  Consult Note    Patient Name: Andry Cantu  MRN: [id number]  Admission Date: 2/18/2022  Hospital Length of Stay: 3 days  Attending Physician: Josh Jimenez MD    Inpatient consult to Physical Medicine & Rehabilitation  Consult performed by: Diandra Rey NP  Consult requested by:  Josh Jimenez MD    Collaborating Physician: Gaby Urrutia MD  Reason for Consult:  Assess rehabilitation needs     Consults  Subjective:     Principal Problem: Stroke due to embolism of right middle cerebral artery    HPI: Andry Cantu is a 56-year-old male with PMHx of HTN, Afib. Patient presented to [facility] on 2/18 for LSW. On arrival, CTH revealed R MCA. MRI with Acute infarct predominantly involving the right basal ganglia without evidence of significant mass effect or midline shift.  Microhemorrhage right putamen without evidence of large hemorrhagic conversion.     Functional History: Patient lives with wife.  Prior to admission, I DME: none.       Hospital Course: 2/21/22: Participated w/ OT. Bed mob Alessio. Sit to stand Alessio. All adl's Alessio.       Past Medical History:   Diagnosis Date    Essential hypertension 2/18/2022    Paroxysmal atrial fibrillation 2/18/2022     No past surgical history on file.  Review of patient's allergies indicates:  No Known Allergies    Scheduled Medications:    apixaban  5 mg Oral BID    aspirin  81 mg Oral Daily    atorvastatin  40 mg Oral Daily    mupirocin   Nasal BID    senna-docusate 8.6-50 mg  1 tablet Oral BID       PRN Medications: acetaminophen, hydrALAZINE, iohexoL, labetalol, ondansetron, sodium chloride 0.9%, sodium chloride 0.9%, sodium chloride 0.9%    Family History    None       Tobacco Use    Smoking status: Not on file    Smokeless tobacco: Not on file   Substance and Sexual Activity    Alcohol use: Not on file    Drug use: Not on file    Sexual activity: Not on file     Review of Systems   Constitutional:   Positive for activity change. Negative for fatigue and fever.   HENT:  Negative for trouble swallowing and voice change.    Respiratory:  Negative for cough and shortness of breath.    Cardiovascular:  Negative for chest pain and leg swelling.   Gastrointestinal:  Negative for abdominal distention and abdominal pain.   Genitourinary:  Negative for difficulty urinating and flank pain.   Musculoskeletal:  Negative for back pain and gait problem.   Skin:  Negative for color change and rash.   Neurological:  Positive for weakness (improved). Negative for speech difficulty and numbness.   Psychiatric/Behavioral:  Negative for agitation and confusion.    Objective:     Vital Signs (Most Recent):  Temp: 98.5 °F (36.9 °C) (02/21/22 1143)  Pulse: 94 (02/21/22 1143)  Resp: 18 (02/21/22 1143)  BP: (!) 140/86 (02/21/22 1143)  SpO2: (!) 92 % (02/21/22 1143)      Vital Signs (24h Range):  Temp:  [96.8 °F (36 °C)-98.9 °F (37.2 °C)] 98.5 °F (36.9 °C)  Pulse:  [] 94  Resp:  [16-20] 18  SpO2:  [90 %-94 %] 92 %  BP: (128-151)/(81-98) 140/86     Body mass index is 37.11 kg/m².    Physical Exam  Vitals and nursing note reviewed.   Constitutional:       Appearance: He is well-developed.   HENT:      Head: Normocephalic and atraumatic.   Eyes:      General:         Right eye: No discharge.         Left eye: No discharge.      Pupils: Pupils are equal, round, and reactive to light.   Pulmonary:      Effort: Pulmonary effort is normal. No respiratory distress.   Abdominal:      General: There is no distension.      Palpations: Abdomen is soft.      Tenderness: There is no abdominal tenderness.   Musculoskeletal:         General: No deformity. Normal range of motion.      Cervical back: Neck supple.   Skin:     General: Skin is warm and dry.   Neurological:      Sensory: No sensory deficit.      Motor: No abnormal muscle tone.   Psychiatric:         Behavior: Behavior normal.     Diagnostic Results: Labs: Reviewed  ECG: Reviewed  CT:  Reviewed    Assessment/Plan:     * Stroke due to embolism of right middle cerebral artery  - Related to prolonged/acute hospital course.     Recommendations  -  Encourage mobility, OOB in chair at least 3 hours per day, and early ambulation as appropriate  -  PT/OT evaluate and treat  -  Pain management  -  Monitor for and prevent skin breakdown and pressure ulcers  · Early mobility, repositioning/weight shifting every 20-30 minutes when sitting, turn patient every 2 hours, proper mattress/overlay and chair cushioning, pressure relief/heel protector boots  -  DVT prophylaxis    -  Reviewed discharge options (IP rehab, SNF, HH therapy, and OP therapy)    Essential hypertension  - Stroke risk factor    PM&R Recommendation:     At this time, the PM&R team has reviewed this patient's ongoing medical case including inpatient diagnosis, medical history, clinical examination, labs, vitals, current social and functional history to provide the post-acute recommendation as follows:     RECOMMENDATIONS: Home Health PT.    We will sign off.     Thank you for your consult.     Diandra Rey NP  Department of Physical Medicine & Rehab  [facility] - Neurosurgery ([facility])

## 2022-02-21 NOTE — PT/OT/SLP EVAL
"Speech Language Pathology Evaluation  Cognitive Communication  Discharge Summary     Patient Name:  Andry Cantu   MRN:  [id number]  Admitting Diagnosis: Stroke due to embolism of right middle cerebral artery    Recommendations:     Recommendations:                General Recommendations:  Follow-up not indicated  Diet recommendations:  Regular, Thin   Aspiration Precautions: Standard aspiration precautions   General Precautions: Standard, aspiration, fall, hearing impaired  Communication strategies:  none    History:     Past Medical History:   Diagnosis Date    Essential hypertension 2/18/2022    Paroxysmal atrial fibrillation 2/18/2022       No past surgical history on file.      Subjective     Patient awake and alert. No family members present.   Communicated with RN prior to entry.     "I don't feel any different"    Pain/Comfort:  ·  No c/o pain     Respiratory Status: Room air    Objective:   Cognitive Status:    Arousal/Alertness Appropriate response to stimuli  Attention No obvious deficits observed .  Perseveration Not present  Orientation Oriented x4  Memory Immediate Recall WFL, Delayed recall of 3/3 unassociated words after 1 min filled delay.  and recall general information WFL  Problem Solving Categories patient provided 10 items within concrete category indep. , Sequencing of 4 items completed with 100% acc. , Solutions to hypothetical situations compelted with 100% acc.  and Compare/contrast completed with 100% acc.   Safety awareness good  Managing finances WFL  Simple calculation WFL                 Receptive Language:   Comprehension:      Questions Complex yes/no WFL  Commands  multistep basic commands WFL     Expressive Language:  Verbal:    Naming Confrontation WFL and Single word responsive naming WFL  Conversational speech WFL                Motor Speech:  WFL     Voice:   WFL     Visual-Spatial:  clock drawing task compelted with good spacing and attention and no visual spatial " tasks.     Reading:   WFL      Written Expression:   WFL     Treatment: patient tolerated thin liquids via sips from water bottle, PO medications in thin liquids, and solids with no oropharyngeal dysphagia. Skilled education was provided to patient  re: diet recs, standard aspiration precautions of which to follow, and discharge ST plan of care.patient is in agreement with plan of care.     Assessment:   Andry Canut is a 56 y.o. male with an SLP diagnosis of no oropharyngeal dysphagia or speech language cognitive derficits.  He presents with no further acute speech therapy needs.    Goals:   Multidisciplinary Problems     SLP Goals        Problem: SLP Goal    Goal Priority Disciplines Outcome   SLP Goal     SLP Ongoing, Progressing   Description: Speech Pathology Goals  To be met by 2/5/22    1. Pt will exhibit a functional swallow for tolerance of a regular diet with thin liquids in order to maintain adequate hydration and nutrition  2. Pt will undergo formal speech and language evaluation to determine presence/severity of speech, language, and/or cognitive linguistic impairment                                Plan:   · Patient to be seen:  3 x/week   · Plan of Care expires:  03/21/22  · Plan of Care reviewed with:  patient, spouse   · SLP Follow-Up:  No       Discharge recommendations:  Discharge Facility/Level of Care Needs: home   Barriers to Discharge:  None    Time Tracking:   SLP Treatment Date:   02/21/22  Speech Start Time:  0850  Speech Stop Time:  0905     Speech Total Time (min):  15 min    Billable Minutes: Eval 8  and Treatment Swallowing Dysfunction 7    02/21/2022

## 2022-02-21 NOTE — SUBJECTIVE & OBJECTIVE
Neurologic Chief Complaint: R MCA syndrome    Subjective:     Interval History: Patient is seen for follow-up neurological assessment and treatment recommendations: Pt seen and examined. Neuro exam stable. Started on Eliquis yesterday evening. Hb stable    HPI, Past Medical, Family, and Social History remains the same as documented in the initial encounter.     Review of Systems   Constitutional:  Negative for activity change, chills, fatigue and fever.   HENT:  Negative for congestion, rhinorrhea, sinus pressure, sinus pain and sore throat.    Eyes:  Negative for visual disturbance.   Respiratory:  Negative for cough, chest tightness, shortness of breath and wheezing.    Cardiovascular:  Negative for chest pain, palpitations and leg swelling.   Gastrointestinal:  Negative for abdominal pain, blood in stool, constipation, diarrhea, nausea and vomiting.   Endocrine: Negative for polydipsia, polyphagia and polyuria.   Genitourinary:  Negative for dysuria, frequency and hematuria.   Musculoskeletal:  Negative for arthralgias and myalgias.   Neurological:  Negative for dizziness, syncope, weakness and light-headedness.   Psychiatric/Behavioral:  Negative for agitation.     Scheduled Meds:   apixaban  5 mg Oral BID    aspirin  81 mg Oral Daily    atorvastatin  40 mg Oral Daily    mupirocin   Nasal BID    senna-docusate 8.6-50 mg  1 tablet Oral BID     Continuous Infusions:  PRN Meds:acetaminophen, hydrALAZINE, iohexoL, labetalol, ondansetron, sodium chloride 0.9%, sodium chloride 0.9%, sodium chloride 0.9%    Objective:     Vital Signs (Most Recent):  Temp: 98.5 °F (36.9 °C) (02/21/22 1143)  Pulse: 94 (02/21/22 1143)  Resp: 18 (02/21/22 1143)  BP: (!) 140/86 (02/21/22 1143)  SpO2: (!) 92 % (02/21/22 1143)  BP Location: Left arm    Vital Signs Range (Last 24H):  Temp:  [96.8 °F (36 °C)-98.9 °F (37.2 °C)]   Pulse:  []   Resp:  [16-20]   BP: (118-151)/(81-98)   SpO2:  [90 %-95 %]   BP Location: Left arm    Physical  Exam  Constitutional:       General: He is not in acute distress.     Appearance: He is not ill-appearing.   HENT:      Head: Normocephalic and atraumatic.      Nose: No congestion or rhinorrhea.      Mouth/Throat:      Mouth: Mucous membranes are moist.      Pharynx: Oropharynx is clear.   Eyes:      Extraocular Movements: Extraocular movements intact.      Pupils: Pupils are equal, round, and reactive to light.   Cardiovascular:      Rate and Rhythm: Normal rate.   Pulmonary:      Effort: No respiratory distress.   Abdominal:      General: There is no distension.   Musculoskeletal:         General: No swelling.      Cervical back: No rigidity.   Skin:     Coloration: Skin is not jaundiced.       Neurological Exam:   LOC: alert  Attention Span: Good   Language: No aphasia  Articulation: No dysarthria  Orientation: Person, Place, Time   Visual Fields: Full  EOM (CN III, IV, VI): Full/intact  Pupils (CN II, III): PERRL  Facial Sensation (CN V): Normal  Facial Movement (CN VII): Symmetric facial expression    Reflexes: 2+ throughout  Motor: Arm left  Normal 5/5  Leg left  Normal 5/5  Arm right  Normal 5/5  Leg right Normal 5/5  Cerebellum: No evidence of appendicular or axial ataxia  Sensation: Intact to light touch, temperature and vibration  Tone: Normal tone throughout    Laboratory:  CMP:   Recent Labs   Lab 02/21/22  0413   CALCIUM 9.4   ALBUMIN 3.4*   PROT 6.7      K 3.9   CO2 24      BUN 13   CREATININE 0.8   ALKPHOS 57   ALT 23   AST 21   BILITOT 0.5     CBC:   Recent Labs   Lab 02/21/22  0413   WBC 8.19   RBC 5.41   HGB 16.0   HCT 46.9      MCV 87   MCH 29.6   MCHC 34.1       Diagnostic Results     Brain imaging:  MRI Brain WO Contrast:   Acute infarct predominantly involving the right basal ganglia without evidence of significant mass effect or midline shift.  Microhemorrhage right putamen without evidence of large hemorrhagic conversion.     Vessel Imaging:  IR Angiogram Carotid Cerebral  BL 2/18/22   Right cervical ICA occlusion with restoration of flow in the ICA and TICI 2 B flow in the right MCA territory on the first pass. There was a distal M2 occlusion which was aspirated on the second pass with 2C flow.        CTA Multiphase. Date: 02/18/22    No hemorrhage  No early ischemic changes  Hyperdense R M1 sign   R carotid/R M1 tandem occlusion     Cardiac Evaluation:   TTE 2/19/22  The estimated ejection fraction is 55%.  The left ventricle is normal in size with normal systolic function.  Normal left ventricular diastolic function.  Normal right ventricular size with normal right ventricular systolic function.  Severe left atrial enlargement.  Normal central venous pressure (3 mmHg).

## 2022-02-22 ENCOUNTER — TELEPHONE (OUTPATIENT)
Dept: NEUROLOGY | Facility: CLINIC | Age: 57
End: 2022-02-22
Payer: COMMERCIAL

## 2022-02-23 ENCOUNTER — TELEPHONE (OUTPATIENT)
Dept: NEUROLOGY | Facility: HOSPITAL | Age: 57
End: 2022-02-23
Payer: COMMERCIAL

## 2022-02-23 NOTE — TELEPHONE ENCOUNTER
Spoke with patient. Risk factors for stroke discussed with teach back method implemented. Verbalized understanding of discharge instructions and medications. Follow up appointment discussed. Warning signs were discussed with teach back method implemented. Instructed to seek medical help via 911 if new symptoms occur. Relayed no new questions or comments at this time.

## 2022-03-07 ENCOUNTER — TELEPHONE (OUTPATIENT)
Dept: NEUROLOGY | Facility: CLINIC | Age: 57
End: 2022-03-07
Payer: COMMERCIAL

## 2022-03-07 NOTE — TELEPHONE ENCOUNTER
----- Message from Ezra Lau sent at 3/7/2022  3:38 PM CST -----  Regarding: callbk about appt tomorrow from [facility]    The Pt's wife states that [facility] offered a sooner appt for tomorrow and would like to confirm a time please.    Ph # [phone] (Pt's wife's # as the Pt is hard of hearing)

## 2022-03-07 NOTE — TELEPHONE ENCOUNTER
Spoke with the pt wife to schedule a earlier appt for clearance to work I added him on on tomorrow  The wife voiced understanding

## 2022-03-07 NOTE — TELEPHONE ENCOUNTER
Spoke with the pt to inform him the provider can not release him with out being seen first and gave him a reminder of his hfu  The pt voiced understanding

## 2022-03-07 NOTE — TELEPHONE ENCOUNTER
----- Message from Selene Jennings sent at 3/7/2022 12:22 PM CST -----  Regarding: pt advice  Contact: Giovana (wife) @ [phone]  Caller asking to speak with someone in Dr. Montes De Oca's office regarding getting a return to work note, prior to his appt. Please call.

## 2022-03-07 NOTE — TELEPHONE ENCOUNTER
----- Message from Talia Hoang sent at 3/7/2022  3:23 PM CST -----  Contact: Patient  Type: Patient Call Back         Who called: Patient         What is the request in detail: Samir Michelle I have Andry Cantu returning your missed call regarding being see first before being release by physician; wants to know if he could move appt up so that he can return to work sooner; please advise         Can the clinic reply by [facility]? Call back         Would the patient rather a call back or a response via [facility]? Call back         Best call back number: [phone]         Additional Information: states he has already been out of work for 3 weeks; states he has been feeling fine            Thank You

## 2022-03-08 ENCOUNTER — OFFICE VISIT (OUTPATIENT)
Dept: NEUROLOGY | Facility: CLINIC | Age: 57
End: 2022-03-08
Payer: COMMERCIAL

## 2022-03-08 VITALS
HEART RATE: 93 BPM | SYSTOLIC BLOOD PRESSURE: 122 MMHG | BODY MASS INDEX: 36.05 KG/M2 | HEIGHT: 69 IN | DIASTOLIC BLOOD PRESSURE: 81 MMHG | WEIGHT: 243.38 LBS

## 2022-03-08 DIAGNOSIS — I10 ESSENTIAL HYPERTENSION: ICD-10-CM

## 2022-03-08 DIAGNOSIS — G47.33 OSA (OBSTRUCTIVE SLEEP APNEA): ICD-10-CM

## 2022-03-08 DIAGNOSIS — Z86.73 H/O ISCHEMIC RIGHT MCA STROKE: Primary | ICD-10-CM

## 2022-03-08 DIAGNOSIS — I48.0 PAROXYSMAL ATRIAL FIBRILLATION: ICD-10-CM

## 2022-03-08 PROCEDURE — 1160F PR REVIEW ALL MEDS BY PRESCRIBER/CLIN PHARMACIST DOCUMENTED: ICD-10-PCS | Mod: CPTII,S$GLB,, | Performed by: NURSE PRACTITIONER

## 2022-03-08 PROCEDURE — 3079F DIAST BP 80-89 MM HG: CPT | Mod: CPTII,S$GLB,, | Performed by: NURSE PRACTITIONER

## 2022-03-08 PROCEDURE — 1159F MED LIST DOCD IN RCRD: CPT | Mod: CPTII,S$GLB,, | Performed by: NURSE PRACTITIONER

## 2022-03-08 PROCEDURE — 1111F DSCHRG MED/CURRENT MED MERGE: CPT | Mod: CPTII,S$GLB,, | Performed by: NURSE PRACTITIONER

## 2022-03-08 PROCEDURE — 1160F RVW MEDS BY RX/DR IN RCRD: CPT | Mod: CPTII,S$GLB,, | Performed by: NURSE PRACTITIONER

## 2022-03-08 PROCEDURE — 3044F PR MOST RECENT HEMOGLOBIN A1C LEVEL <7.0%: ICD-10-PCS | Mod: CPTII,S$GLB,, | Performed by: NURSE PRACTITIONER

## 2022-03-08 PROCEDURE — 3008F BODY MASS INDEX DOCD: CPT | Mod: CPTII,S$GLB,, | Performed by: NURSE PRACTITIONER

## 2022-03-08 PROCEDURE — 99215 OFFICE O/P EST HI 40 MIN: CPT | Mod: S$GLB,,, | Performed by: NURSE PRACTITIONER

## 2022-03-08 PROCEDURE — 4010F ACE/ARB THERAPY RXD/TAKEN: CPT | Mod: CPTII,S$GLB,, | Performed by: NURSE PRACTITIONER

## 2022-03-08 PROCEDURE — 99215 PR OFFICE/OUTPT VISIT, EST, LEVL V, 40-54 MIN: ICD-10-PCS | Mod: S$GLB,,, | Performed by: NURSE PRACTITIONER

## 2022-03-08 PROCEDURE — 3044F HG A1C LEVEL LT 7.0%: CPT | Mod: CPTII,S$GLB,, | Performed by: NURSE PRACTITIONER

## 2022-03-08 PROCEDURE — 99999 PR PBB SHADOW E&M-EST. PATIENT-LVL III: ICD-10-PCS | Mod: PBBFAC,,, | Performed by: NURSE PRACTITIONER

## 2022-03-08 PROCEDURE — 99999 PR PBB SHADOW E&M-EST. PATIENT-LVL III: CPT | Mod: PBBFAC,,, | Performed by: NURSE PRACTITIONER

## 2022-03-08 PROCEDURE — 1159F PR MEDICATION LIST DOCUMENTED IN MEDICAL RECORD: ICD-10-PCS | Mod: CPTII,S$GLB,, | Performed by: NURSE PRACTITIONER

## 2022-03-08 PROCEDURE — 3074F SYST BP LT 130 MM HG: CPT | Mod: CPTII,S$GLB,, | Performed by: NURSE PRACTITIONER

## 2022-03-08 PROCEDURE — 3008F PR BODY MASS INDEX (BMI) DOCUMENTED: ICD-10-PCS | Mod: CPTII,S$GLB,, | Performed by: NURSE PRACTITIONER

## 2022-03-08 PROCEDURE — 4010F PR ACE/ARB THEARPY RXD/TAKEN: ICD-10-PCS | Mod: CPTII,S$GLB,, | Performed by: NURSE PRACTITIONER

## 2022-03-08 PROCEDURE — 3074F PR MOST RECENT SYSTOLIC BLOOD PRESSURE < 130 MM HG: ICD-10-PCS | Mod: CPTII,S$GLB,, | Performed by: NURSE PRACTITIONER

## 2022-03-08 PROCEDURE — 3079F PR MOST RECENT DIASTOLIC BLOOD PRESSURE 80-89 MM HG: ICD-10-PCS | Mod: CPTII,S$GLB,, | Performed by: NURSE PRACTITIONER

## 2022-03-08 PROCEDURE — 1111F PR DISCHARGE MEDS RECONCILED W/ CURRENT OUTPATIENT MED LIST: ICD-10-PCS | Mod: CPTII,S$GLB,, | Performed by: NURSE PRACTITIONER

## 2022-03-08 RX ORDER — ATORVASTATIN CALCIUM 40 MG/1
40 TABLET, FILM COATED ORAL DAILY
Qty: 90 TABLET | Refills: 3 | Status: SHIPPED | OUTPATIENT
Start: 2022-03-08 | End: 2023-03-08

## 2022-03-08 NOTE — LETTER
March 8, 2022      [facility] - Neurology 8th Fl  [address]  [facility] [address]  Phone: [phone]  Fax: [phone]       Patient: Andry Cantu   YOB: 1965  Date of Visit: 03/08/2022    To Whom It May Concern:    Valerie Cantu  was at [facility] on 03/08/2022. The patient may return to work/school on 09/09/22 with no restrictions. If you have any questions or concerns, or if I can be of further assistance, please do not hesitate to contact me.    Sincerely,    Mari Mann, NP

## 2022-03-09 PROBLEM — G47.33 OSA (OBSTRUCTIVE SLEEP APNEA): Status: ACTIVE | Noted: 2022-03-09

## 2022-03-09 NOTE — PROGRESS NOTES
[facility] VISIT      SUBJECTIVE:    History for Present Illness: Andry Cantu is a 56 y.o.  male  who presents to me in clinic today for initial assessment and recommendations following hospitalization on 02/18/2022 for left-sided weakness, dysarthria, and left facial droop.  MRI indicated right basal ganglion infarct.  Patient received IV alteplase underwent successful mechanical thrombectomy of the right cervical ICA with a T2b flow.  Patient is accompanied to today's visit by his wife.    At the time of today's visit, the patient states that he is 100% back to his pre stroke baseline. He denies associated nausea, vomiting, HA ,vertigo, double vision, focal weakness or numbness, gait imbalance,  language or visual disturbances.  Patient denies alcohol/tobacco/illicit drug use.  Ambulates independently.  Independent with all ADLs.        Past Medical History:   Diagnosis Date    Essential hypertension 2/18/2022    Paroxysmal atrial fibrillation 2/18/2022     History reviewed. No pertinent surgical history.  History reviewed. No pertinent family history.     Current Outpatient Medications:     losartan (COZAAR) 25 MG tablet, Take 12.5 mg by mouth once daily., Disp: , Rfl:     apixaban (ELIQUIS) 5 mg Tab, Take 1 tablet (5 mg total) by mouth 2 (two) times daily., Disp: 60 tablet, Rfl: 6    atorvastatin (LIPITOR) 40 MG tablet, Take 1 tablet (40 mg total) by mouth once daily., Disp: 90 tablet, Rfl: 3     Review of Systems:   Constitution: negative for lethargy ; no recent changes in weight  Eyes: no eyesight worsening; no double vision; no eye pain  ENT/Mouth: no nasal congestion ; no nose bleeds; no sore throat or hoarseness  Cardiovascular:  no chest pain with exertion; no palpitations  Respiratory: Normal effort and rate; no coughing  Gastrointestinal: No N/V; negative abdominal pain; no changes in bowel habits  Genitourinary:  no increased frequency in voiding ; no  "incontinence  Musculoskeletal:  No joint pain; no swelling; no myalgia   Skin:  negative for skin rash or lesions  Hematologic: negative for bruising  Neurologic: negative headache; negative dizziness; negative confusion    OBJECTIVE:    /81 (BP Location: Left arm, Patient Position: Sitting, BP Method: Large (Automatic))   Pulse 93   Ht 5' 9" (1.753 m)   Wt 110.4 kg (243 lb 6.2 oz)   BMI 35.94 kg/m²     Physical Exam   Constitution: He appears well nourished. No distress   LOC: Alert and follows request  Head: Normocephalic and atraumatic.   Cardiovascular: Normal rate. Intact distal pulses  Pulmonary/Chest: Effort normal. No respiratory distress  Musculoskeletal: Negative joint swelling or tenderness. No range of motion restrictions elsewhere about the body except that noted in the history of present illness.  Psychiatric: no pressured speech; normal affect; no evidence of impaired cognition    Neurologic Exam:  Orientation: person, place and time  Language: No aphasia  Speech: No dysarthria  Memory: Recent memory intact; Remote memory intact; age correct; month correct  Visual Fields (CN II):  Full  EOM (CN III, IV, VI): Full intact  Pupils (CN II, III): PERRL  Facial Sensation (CN V): symmetric  Facial Movement (CN VII):  Slight left lower facial droop   Hearing (CN VIII):  Intact bilaterally   Shoulder/Neck (CN XI): SCM-Left: Normal; SCM-Right: Normal; Left Shoulder Shrug: Normal/Symmetric ; Right Shoulder Shrug: Normal/Symmetric  Tongue (CN XII): to midline  Reflexes: flexor plantar responses bilaterally and 2+ throughout  Motor: Arm Left:  Normal (5/5), Leg Left: Normal (5/5), Arm Right: Normal (5/5), Leg Right: Normal (5/5)  Cerebellar: Finger-to-nose, heel to shin, MAYRAs, and fine motor coordination is wnl and without slowing or asymmetry  Sensation: intact to light touch                                                                                                                                  "                                                        NIHSS:1  Modified [place] Score:  0     Relevant Labwork:  Recent Labs   Lab 02/18/22  1820 02/18/22  2238   Hemoglobin A1C  --  5.0   LDL Cholesterol 88.4  --    HDL 31 L  --    Triglycerides 78  --    Cholesterol 135  --        Diagnostic Results:  Brain Imaging   MRI of the brain 02/19/2022:  Acute infarct predominantly involving the right basal ganglia without evidence of significant mass effect or midline shift.    Microhemorrhage right putamen without evidence of large hemorrhagic conversion  Vessel Imaging   IR angiogram 02/18/2022:  Right cervical ICA occlusion with restoration of flow in the ICA and a T2b flow in the right MCA territory on 1st pass.    There was a distal M2 occlusion which was aspirated on the 2nd past with a to 2C flow.  Cardiac Imaging   TTE 02/19/2022:  EF of 55%.  Severe left atrial enlargement.  No wall motion abnormality  Assessment:  Andry Cantu  is a 56 y.o.  male  seen today in clinic for follow-up assessment and recommendations. The following recommendations and plan were discussed in depth with the patient who voiced understanding and was in agreement.  Plan:  History of embolic right MCA stroke  -Stroke etiology suspected cardioembolic in the presence of atrial fibrillation.   -Plan for aggressive risk factor modification and maximum medical management  - Anticoagulation: continue Eliquis 5 mg b.i.d.  - Statins: continue atorvastatin 40 mg daily  - Aggressive risk factor modification: HTN, Diet, Exercise, HLD, atrial fibrillation  - Rehab efforts:  Patient recently discharged from outpatient therapy.  - Diagnostics ordered/pending: none     Mixed Hyperlipidemia   Lab Results   Component Value Date    LDLCALC 88.4 02/18/2022     -Target is LDL < 70mg/dL  - Continue atorvastatin 40 mg daily  - Follow up with PCP for chronic management and CV risk reduction    Essential Hypertension   - Continue home medications  - Long term  goal 130/80  -Follow up with PCP for chronic management and CV risk reduction    Atrial fibrillation  -Stroke risk factor  -Continue home Eliquis 5 mg b.i.d.  -Follow up with cardiology for long term management and CV risk reduction    WINDY  -per patient and wife patient has a history of WINDY.  He is not currently using CPAP.   -ambulatory referral for sleep study    Patient/Family teaching  -A significant amount of time was spent reviewing the patient's stroke risk factors and the importance to modify them in order to reduce the risk of future events.  Also, the importance of a healthy diet and daily exercise in order to reduce the risk of future strokes.     We discussed the usual stroke warning signs and symptoms, and the need to activate EMS (call 911) as soon as the symptoms present.  - Sudden onset numbness or weakness of the face, arm, or leg;  especially on one side of the body  - Sudden confusion, trouble speaking, or understanding  - Sudden trouble seeing in one or both eyes  - Sudden trouble walking, dizziness, loss of coordination  - Sudden severe headache with no known cause      I will plan on having Andry return to clinic as needed. The patient can contact my office with any questions or concerns they may have as they arise in the interim.     60 minutes of total time spent on the encounter, which includes face to face time and non-face to face time preparing to see the patient (eg, review of tests), Obtaining and/or reviewing separately obtained history, Documenting clinical information in the electronic or other health record, Independently interpreting results (not separately reported) and communicating results to the patient/family/caregiver, or Care coordination (not separately reported).     Mari Mann NP-C  Department of Vascular Neurology  [facility]  [phone]    This note is dictated on M*Modal Fluency Direct word recognition program. There are word recognition  mistakes that are occasionally missed on review

## 2022-03-16 ENCOUNTER — TELEPHONE (OUTPATIENT)
Dept: ORTHOPEDICS | Facility: CLINIC | Age: 57
End: 2022-03-16
Payer: COMMERCIAL

## 2022-03-16 NOTE — TELEPHONE ENCOUNTER
3/16/22 [facility] [place] for pt to call the referrals department to schedule appt with Sleep clinic-

## 2022-03-30 ENCOUNTER — OFFICE VISIT (OUTPATIENT)
Dept: CARDIOLOGY | Facility: CLINIC | Age: 57
End: 2022-03-30
Payer: COMMERCIAL

## 2022-03-30 VITALS
WEIGHT: 237 LBS | DIASTOLIC BLOOD PRESSURE: 73 MMHG | HEART RATE: 72 BPM | SYSTOLIC BLOOD PRESSURE: 123 MMHG | HEIGHT: 69 IN | OXYGEN SATURATION: 95 % | BODY MASS INDEX: 35.1 KG/M2

## 2022-03-30 DIAGNOSIS — I48.0 PAROXYSMAL ATRIAL FIBRILLATION: ICD-10-CM

## 2022-03-30 DIAGNOSIS — I48.19 PERSISTENT ATRIAL FIBRILLATION: Primary | ICD-10-CM

## 2022-03-30 DIAGNOSIS — I10 ESSENTIAL HYPERTENSION: ICD-10-CM

## 2022-03-30 DIAGNOSIS — I63.411 STROKE DUE TO EMBOLISM OF RIGHT MIDDLE CEREBRAL ARTERY: ICD-10-CM

## 2022-03-30 PROCEDURE — 99204 PR OFFICE/OUTPT VISIT, NEW, LEVL IV, 45-59 MIN: ICD-10-PCS | Mod: S$GLB,,, | Performed by: INTERNAL MEDICINE

## 2022-03-30 PROCEDURE — 99204 OFFICE O/P NEW MOD 45 MIN: CPT | Mod: S$GLB,,, | Performed by: INTERNAL MEDICINE

## 2022-03-30 PROCEDURE — 99999 PR PBB SHADOW E&M-EST. PATIENT-LVL IV: CPT | Mod: PBBFAC,,, | Performed by: INTERNAL MEDICINE

## 2022-03-30 PROCEDURE — 99999 PR PBB SHADOW E&M-EST. PATIENT-LVL IV: ICD-10-PCS | Mod: PBBFAC,,, | Performed by: INTERNAL MEDICINE

## 2022-03-30 NOTE — ASSESSMENT & PLAN NOTE
I had a long discussion with the patient about the pathophysiology and risks of atrial fibrillation and its basic pathophysiology, including its health implications and treatment options. Specifically, I addressed the need for CVA (stroke) prophylaxis with oral anticoagulation discussed bleeding risks.     I also recommended patient be on low dose beta blocker.   Hr measured during clinic encounter was 72 at 2 separate checks  Reports being asymptomatic in regards to his AF.  I explained that BB would reduce his chances of going into RVR with AF.    Given the time spent on AF path, risks including need for AC and BB, watchman device, I did not have much time to explain rhythm control strategy.    Pt interested in watchman device.   EP referral placed. Appreciate assistance including watchman FLX discussion and rhythm control strategies.  Continue anticoagulation at this time.

## 2022-03-30 NOTE — PROGRESS NOTES
Carlos Manuel Henderson MD  ECHO, [place] [facility] in [facility] - Echo / Stress Lab on 2/19/2022  Eun Thao MD in [facility] - Cardiology - [facility] on 3/30/2022    Subjective:   Patient ID:  Andry Cantu is a 56 y.o. male who presents for evaluation of new onset AF    He is a 57 yo M with pmhx s/f HTN. He was recent hospitalized in 2/2021 for CVA after presenting with LSW, dysarthria, and L facial droop. He underwent tpa and thrombectomy with resolution of symptoms. During that admission he was noted to be in AF throughout admission. He was discharged on eliquis with outpatient referral to see Cardiology for management of the AF.    Patient stating he is doing well. He only complaint was infrequent episodes of fullness in his chest with short duration when going to sleep. Aside from that, he denies palpitations, chest pain, shortness of breath, ESTRADA, malaise or other symptoms.    Long discussion held about the diagnosis and risks of AF. 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 I also noted that patient was not on a beta blocker. His wife and he expressed a desire to be on minimal medications if possible. They expressed plan to obtain an apple watch and monitor heart rate closely at home. Since the patient reported feeling asymptomatic, and our discussion regarding the diagnosis, risks, and watchman device, I was not able to spend much time regarding rhythm control, but they were very agreeable to seeing an electrophysiologist.     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  Family: Father had AF.  Social: Reviewed, as below.         Review of Systems   Constitutional: Negative  "for chills, decreased appetite and fever.   HENT: Negative for congestion and sore throat.    Eyes: Negative for blurred vision and discharge.   Cardiovascular: Negative for chest pain, claudication, cyanosis, dyspnea on exertion and leg swelling.   Respiratory: Negative for cough, hemoptysis and shortness of breath.    Endocrine: Negative for cold intolerance and heat intolerance.   Skin: Negative for color change.   Musculoskeletal: Negative for muscle weakness and myalgias.   Gastrointestinal: Negative for bloating and abdominal pain.   Neurological: Negative for dizziness, focal weakness and weakness.   Psychiatric/Behavioral: Negative for altered mental status and depression.       Past Medical History:   Diagnosis Date    Essential hypertension 2/18/2022    Paroxysmal atrial fibrillation 2/18/2022       History reviewed. No pertinent surgical history.    History reviewed. No pertinent family history.      Current Outpatient Medications:     apixaban (ELIQUIS) 5 mg Tab, Take 1 tablet (5 mg total) by mouth 2 (two) times daily., Disp: 60 tablet, Rfl: 6    atorvastatin (LIPITOR) 40 MG tablet, Take 1 tablet (40 mg total) by mouth once daily., Disp: 90 tablet, Rfl: 3    losartan (COZAAR) 25 MG tablet, Take 12.5 mg by mouth once daily., Disp: , Rfl:   Objective:   /73 (BP Location: Left arm, Patient Position: Sitting, BP Method: Large (Automatic))   Pulse 72   Ht 5' 9" (1.753 m)   Wt 107.5 kg (236 lb 15.9 oz)   SpO2 95%   BMI 35.00 kg/m²      Physical Exam  Constitutional:       Appearance: He is well-developed.   HENT:      Head: Normocephalic and atraumatic.      Right Ear: External ear normal.      Left Ear: External ear normal.   Eyes:      Conjunctiva/sclera: Conjunctivae normal.   Cardiovascular:      Rate and Rhythm: Normal rate. Rhythm irregular.      Pulses: Intact distal pulses.           Radial pulses are 2+ on the right side and 2+ on the left side.      Heart sounds: Normal heart sounds. " "  Pulmonary:      Effort: Pulmonary effort is normal. No respiratory distress.      Breath sounds: Normal breath sounds. No wheezing.   Abdominal:      General: Bowel sounds are normal. There is no distension.      Palpations: Abdomen is soft.      Tenderness: There is no abdominal tenderness.   Musculoskeletal:         General: Normal range of motion.      Cervical back: Normal range of motion and neck supple.   Skin:     General: Skin is warm and dry.   Neurological:      Mental Status: He is alert and oriented to person, place, and time.   Psychiatric:         Mood and Affect: Mood normal.         Behavior: Behavior normal.         Lab Results   Component Value Date     02/21/2022    K 3.9 02/21/2022     02/21/2022    CO2 24 02/21/2022    BUN 13 02/21/2022    CREATININE 0.8 02/21/2022    ANIONGAP 11 02/21/2022     Lab Results   Component Value Date    HGBA1C 5.0 02/18/2022     No results found for: BNP, BNPTRIAGEBLO    Lab Results   Component Value Date    WBC 8.19 02/21/2022    HGB 16.0 02/21/2022    HCT 46.9 02/21/2022    HCT 43 02/18/2022     02/21/2022    GRAN 5.1 02/21/2022    GRAN 61.8 02/21/2022     Lab Results   Component Value Date    CHOL 135 02/18/2022    HDL 31 (L) 02/18/2022    LDLCALC 88.4 02/18/2022    TRIG 78 02/18/2022        Assessment:     1. Persistent atrial fibrillation    2. Essential hypertension    3. Paroxysmal atrial fibrillation    4. Stroke due to embolism of right middle cerebral artery        Plan:   Essential hypertension  Blood pressure currently well controlled  Hypertension:  -BP today: /73 (BP Location: Left arm, Patient Position: Sitting, BP Method: Large (Automatic))   Pulse 72   Ht 5' 9" (1.753 m)   Wt 107.5 kg (236 lb 15.9 oz)   SpO2 95%   BMI 35.00 kg/m²   -HTN well controlled, pt advised to continue current regiment  -not interested in starting a beta blocker at this time, which would primarily be used for rate control until he is seen by " EP      -Pt advised to be compliant with their treatment regimen daily to avoid BP fluctuation and any complications.   -Pt advised to monitor their blood pressure regularly and bring their recorded results to future visit  -Pt educated that it is important to eat right. Following a reasonable-calorie low-salt diet (Such as the DASH diet/Mediterranean) has been shown to control blood pressure better with less medications.     Paroxysmal atrial fibrillation  I had a long discussion with the patient about the pathophysiology and risks of atrial fibrillation and its basic pathophysiology, including its health implications and treatment options. Specifically, I addressed the need for CVA (stroke) prophylaxis with oral anticoagulation discussed bleeding risks.     I also recommended patient be on low dose beta blocker.   Hr measured during clinic encounter was 72 at 2 separate checks  Reports being asymptomatic in regards to his AF.  I explained that BB would reduce his chances of going into RVR with AF.    Given the time spent on AF path, risks including need for AC and BB, watchman device, I did not have much time to explain rhythm control strategy.    Pt interested in watchman device.   EP referral placed. Appreciate assistance including watchman FLX discussion and rhythm control strategies.  Continue anticoagulation at this time.         Stroke due to embolism of right middle cerebral artery  Continue statin, anticoagulation            Patient seen and examined with attending Cardiologist, Dr. Bland, who agrees with management and plan.    Eun Thao MD  Cardiovascular Disease Fellow PGY V  Pager [phone]

## 2022-03-30 NOTE — ASSESSMENT & PLAN NOTE
"Blood pressure currently well controlled  Hypertension:  -BP today: /73 (BP Location: Left arm, Patient Position: Sitting, BP Method: Large (Automatic))   Pulse 72   Ht 5' 9" (1.753 m)   Wt 107.5 kg (236 lb 15.9 oz)   SpO2 95%   BMI 35.00 kg/m²   -HTN well controlled, pt advised to continue current regiment  -not interested in starting a beta blocker at this time, which would primarily be used for rate control until he is seen by EP      -Pt advised to be compliant with their treatment regimen daily to avoid BP fluctuation and any complications.   -Pt advised to monitor their blood pressure regularly and bring their recorded results to future visit  -Pt educated that it is important to eat right. Following a reasonable-calorie low-salt diet (Such as the DASH diet/Mediterranean) has been shown to control blood pressure better with less medications.   "

## 2024-09-04 PROBLEM — R97.20 ELEVATED PSA: Status: ACTIVE | Noted: 2024-09-04

## 2024-09-04 NOTE — PROGRESS NOTES
"Clinic Note  9/4/2024      Subjective:         Chief Complaint:   HPI  Andry Cantu is a 58 y.o. male with a history of elevated PSA. He has a medical history significant for CVA, currently on Eliquis.   PSA 7.0 on 7/5/2024.  FH - negative     He endorses Erectile dysfunction. He takes both Viagra and Cialis.   He denies lower urinary tract symptoms.    Patient on Eliquis.      PCPT Prostate Cancer Risk Calculator        No results found for: "PSA", "PSADIAG", "PSATOTAL", "PSAFREE", "PSAFREEPCT"   Past Medical History:   Diagnosis Date    Essential hypertension 2/18/2022    Paroxysmal atrial fibrillation 2/18/2022     No family history on file.  Social History     Socioeconomic History    Marital status:    Tobacco Use    Smoking status: Never    Smokeless tobacco: Never     No past surgical history on file.  Patient Active Problem List   Diagnosis    Stroke due to embolism of right middle cerebral artery    Paroxysmal atrial fibrillation    Essential hypertension    WINDY (obstructive sleep apnea)    Elevated PSA     Review of Systems   Constitutional:  Negative for appetite change, chills, fatigue, fever and unexpected weight change.   HENT:  Negative for nosebleeds.    Respiratory: Negative.  Negative for shortness of breath and wheezing.    Cardiovascular: Negative.  Negative for chest pain, palpitations and leg swelling.   Gastrointestinal: Negative.  Negative for abdominal distention, abdominal pain, constipation, diarrhea, nausea and vomiting.   Genitourinary: Negative.  Negative for dysuria and hematuria.   Musculoskeletal:  Negative for arthralgias and back pain.   Skin:  Negative for pallor.   Neurological:  Negative for dizziness, seizures and syncope.   Hematological:  Negative for adenopathy.   Psychiatric/Behavioral:  Negative for dysphoric mood.          Objective:      There were no vitals taken for this visit.  Estimated body mass index is 35 kg/m² as calculated from the following:    Height as " "of 3/30/22: 5' 9" (1.753 m).    Weight as of 3/30/22: 107.5 kg (236 lb 15.9 oz).  Physical Exam  Vitals reviewed.   Constitutional:       Appearance: Normal appearance.   HENT:      Head: Normocephalic and atraumatic.   Eyes:      Extraocular Movements: Extraocular movements intact.      Pupils: Pupils are equal, round, and reactive to light.   Pulmonary:      Effort: Pulmonary effort is normal.   Abdominal:      General: Abdomen is flat.      Palpations: Abdomen is soft.   Genitourinary:     Comments: Approximately 50 grams, benign.   Skin:     General: Skin is warm.      Capillary Refill: Capillary refill takes less than 2 seconds.   Neurological:      General: No focal deficit present.      Mental Status: He is alert.   Psychiatric:         Mood and Affect: Mood normal.       Assessment and Plan:           Problem List Items Addressed This Visit       Elevated PSA - Primary       Follow up:     Prostate MRI and UroNav biopsy       Hermilo Mcqueen          "

## 2024-09-05 ENCOUNTER — OFFICE VISIT (OUTPATIENT)
Dept: UROLOGY | Facility: CLINIC | Age: 59
End: 2024-09-05
Payer: COMMERCIAL

## 2024-09-05 VITALS
DIASTOLIC BLOOD PRESSURE: 84 MMHG | HEART RATE: 88 BPM | WEIGHT: 229.06 LBS | HEIGHT: 69 IN | SYSTOLIC BLOOD PRESSURE: 131 MMHG | BODY MASS INDEX: 33.93 KG/M2

## 2024-09-05 DIAGNOSIS — R97.20 ELEVATED PSA: Primary | ICD-10-CM

## 2024-09-05 PROCEDURE — 3079F DIAST BP 80-89 MM HG: CPT | Mod: CPTII,S$GLB,, | Performed by: UROLOGY

## 2024-09-05 PROCEDURE — 1160F RVW MEDS BY RX/DR IN RCRD: CPT | Mod: CPTII,S$GLB,, | Performed by: UROLOGY

## 2024-09-05 PROCEDURE — 99999 PR PBB SHADOW E&M-EST. PATIENT-LVL III: CPT | Mod: PBBFAC,,, | Performed by: UROLOGY

## 2024-09-05 PROCEDURE — 1159F MED LIST DOCD IN RCRD: CPT | Mod: CPTII,S$GLB,, | Performed by: UROLOGY

## 2024-09-05 PROCEDURE — 3075F SYST BP GE 130 - 139MM HG: CPT | Mod: CPTII,S$GLB,, | Performed by: UROLOGY

## 2024-09-05 PROCEDURE — 3008F BODY MASS INDEX DOCD: CPT | Mod: CPTII,S$GLB,, | Performed by: UROLOGY

## 2024-09-05 PROCEDURE — 99204 OFFICE O/P NEW MOD 45 MIN: CPT | Mod: S$GLB,,, | Performed by: UROLOGY

## 2024-09-05 RX ORDER — CIPROFLOXACIN 500 MG/1
500 TABLET ORAL ONCE
Qty: 1 TABLET | Refills: 0 | Status: SHIPPED | OUTPATIENT
Start: 2024-09-05 | End: 2024-09-05

## 2024-09-05 RX ORDER — LOSARTAN POTASSIUM AND HYDROCHLOROTHIAZIDE 12.5; 1 MG/1; MG/1
1 TABLET ORAL
COMMUNITY
Start: 2024-07-10

## 2024-09-05 RX ORDER — CEFTRIAXONE 1 G/1
1 INJECTION, POWDER, FOR SOLUTION INTRAMUSCULAR; INTRAVENOUS
Status: SHIPPED | OUTPATIENT
Start: 2024-09-05 | End: 2024-09-06

## 2024-10-21 ENCOUNTER — TELEPHONE (OUTPATIENT)
Dept: UROLOGY | Facility: CLINIC | Age: 59
End: 2024-10-21
Payer: COMMERCIAL

## 2024-10-21 NOTE — TELEPHONE ENCOUNTER
Spoke with patient who was able to provide acceptable patient identifiers prior to start of conversation. Called pt to notify him of pre balance for MRI on tomorrow, pt stated, that he has already paid the bill.

## 2024-10-22 ENCOUNTER — HOSPITAL ENCOUNTER (OUTPATIENT)
Dept: RADIOLOGY | Facility: HOSPITAL | Age: 59
Discharge: HOME | End: 2024-10-22
Attending: UROLOGY
Payer: COMMERCIAL

## 2024-10-22 DIAGNOSIS — R97.20 ELEVATED PSA: ICD-10-CM

## 2024-10-22 PROCEDURE — A9585 GADOBUTROL INJECTION: HCPCS | Performed by: UROLOGY

## 2024-10-22 PROCEDURE — 25500020 PHARM REV CODE 255: Performed by: UROLOGY

## 2024-10-22 PROCEDURE — 72197 MRI PELVIS W/O & W/DYE: CPT | Mod: 26,,, | Performed by: RADIOLOGY

## 2024-10-22 PROCEDURE — 72197 MRI PELVIS W/O & W/DYE: CPT | Mod: TC

## 2024-10-22 RX ORDER — GADOBUTROL 604.72 MG/ML
10 INJECTION INTRAVENOUS
Status: COMPLETED | OUTPATIENT
Start: 2024-10-22 | End: 2024-10-22

## 2024-10-22 RX ADMIN — GADOBUTROL 10 ML: 604.72 INJECTION INTRAVENOUS at 05:10

## 2024-10-29 ENCOUNTER — PROCEDURE VISIT (OUTPATIENT)
Dept: UROLOGY | Facility: CLINIC | Age: 59
End: 2024-10-29
Payer: COMMERCIAL

## 2024-10-29 VITALS
HEART RATE: 65 BPM | TEMPERATURE: 97 F | DIASTOLIC BLOOD PRESSURE: 97 MMHG | BODY MASS INDEX: 34.36 KG/M2 | RESPIRATION RATE: 17 BRPM | SYSTOLIC BLOOD PRESSURE: 142 MMHG | WEIGHT: 232.69 LBS

## 2024-10-29 DIAGNOSIS — R97.20 ELEVATED PSA: Primary | ICD-10-CM

## 2024-10-29 PROCEDURE — 88305 TISSUE EXAM BY PATHOLOGIST: CPT | Mod: 59 | Performed by: STUDENT IN AN ORGANIZED HEALTH CARE EDUCATION/TRAINING PROGRAM

## 2024-10-29 RX ORDER — LIDOCAINE HYDROCHLORIDE 20 MG/ML
JELLY TOPICAL
Status: COMPLETED | OUTPATIENT
Start: 2024-10-29 | End: 2024-10-29

## 2024-10-29 RX ORDER — LIDOCAINE HYDROCHLORIDE 10 MG/ML
20 INJECTION, SOLUTION INFILTRATION; PERINEURAL
Status: COMPLETED | OUTPATIENT
Start: 2024-10-29 | End: 2024-10-29

## 2024-10-29 RX ORDER — CEFTRIAXONE 1 G/1
1 INJECTION, POWDER, FOR SOLUTION INTRAMUSCULAR; INTRAVENOUS
Status: COMPLETED | OUTPATIENT
Start: 2024-10-29 | End: 2024-10-29

## 2024-10-29 RX ADMIN — LIDOCAINE HYDROCHLORIDE 20 ML: 10 INJECTION, SOLUTION INFILTRATION; PERINEURAL at 08:10

## 2024-10-29 RX ADMIN — CEFTRIAXONE 1 G: 1 INJECTION, POWDER, FOR SOLUTION INTRAMUSCULAR; INTRAVENOUS at 08:10

## 2024-10-29 RX ADMIN — LIDOCAINE HYDROCHLORIDE: 20 JELLY TOPICAL at 08:10

## 2024-11-01 ENCOUNTER — TELEPHONE (OUTPATIENT)
Dept: UROLOGY | Facility: CLINIC | Age: 59
End: 2024-11-01
Payer: COMMERCIAL

## 2024-11-01 LAB
FINAL PATHOLOGIC DIAGNOSIS: NORMAL
GROSS: NORMAL
Lab: NORMAL

## 2024-11-04 ENCOUNTER — PATIENT MESSAGE (OUTPATIENT)
Dept: UROLOGY | Facility: CLINIC | Age: 59
End: 2024-11-04
Payer: COMMERCIAL

## 2024-11-04 DIAGNOSIS — R97.20 ELEVATED PSA: Primary | ICD-10-CM

## 2025-02-21 ENCOUNTER — OFFICE VISIT (OUTPATIENT)
Dept: INTERNAL MEDICINE | Facility: CLINIC | Age: 60
End: 2025-02-21
Payer: COMMERCIAL

## 2025-02-21 VITALS
HEART RATE: 75 BPM | HEIGHT: 69 IN | DIASTOLIC BLOOD PRESSURE: 63 MMHG | WEIGHT: 235.25 LBS | SYSTOLIC BLOOD PRESSURE: 120 MMHG | BODY MASS INDEX: 34.84 KG/M2 | OXYGEN SATURATION: 95 %

## 2025-02-21 DIAGNOSIS — I63.411 STROKE DUE TO EMBOLISM OF RIGHT MIDDLE CEREBRAL ARTERY: ICD-10-CM

## 2025-02-21 DIAGNOSIS — I48.0 PAROXYSMAL ATRIAL FIBRILLATION: ICD-10-CM

## 2025-02-21 DIAGNOSIS — I10 ESSENTIAL HYPERTENSION: ICD-10-CM

## 2025-02-21 DIAGNOSIS — R97.20 ELEVATED PSA: ICD-10-CM

## 2025-02-21 DIAGNOSIS — E66.09 CLASS 1 OBESITY DUE TO EXCESS CALORIES WITH SERIOUS COMORBIDITY AND BODY MASS INDEX (BMI) OF 34.0 TO 34.9 IN ADULT: ICD-10-CM

## 2025-02-21 DIAGNOSIS — Z23 NEED FOR VACCINATION: ICD-10-CM

## 2025-02-21 DIAGNOSIS — G47.33 OSA (OBSTRUCTIVE SLEEP APNEA): ICD-10-CM

## 2025-02-21 DIAGNOSIS — H91.93 BILATERAL HEARING LOSS, UNSPECIFIED HEARING LOSS TYPE: ICD-10-CM

## 2025-02-21 DIAGNOSIS — Z12.5 ENCOUNTER FOR SCREENING FOR MALIGNANT NEOPLASM OF PROSTATE: ICD-10-CM

## 2025-02-21 DIAGNOSIS — Z00.00 ENCOUNTER FOR ANNUAL GENERAL MEDICAL EXAMINATION WITHOUT ABNORMAL FINDINGS IN ADULT: Primary | ICD-10-CM

## 2025-02-21 DIAGNOSIS — Z76.89 ENCOUNTER TO ESTABLISH CARE WITH NEW DOCTOR: ICD-10-CM

## 2025-02-21 DIAGNOSIS — Z12.11 ENCOUNTER FOR SCREENING FOR MALIGNANT NEOPLASM OF COLON: ICD-10-CM

## 2025-02-21 DIAGNOSIS — E66.811 CLASS 1 OBESITY DUE TO EXCESS CALORIES WITH SERIOUS COMORBIDITY AND BODY MASS INDEX (BMI) OF 34.0 TO 34.9 IN ADULT: ICD-10-CM

## 2025-02-21 PROCEDURE — 99999 PR PBB SHADOW E&M-EST. PATIENT-LVL III: CPT | Mod: PBBFAC,,, | Performed by: FAMILY MEDICINE

## 2025-02-21 NOTE — PROGRESS NOTES
Subjective:     Patient ID: Andry Cantu is a 59 y.o. male.   Chief Complaint: Annual Exam and Establish Care    HPI:  History of Present Illness    CHIEF COMPLAINT:  Patient presents today for annual exam and to establish care    He reports lower than typical blood pressure readings at home and experiences increased fatigue in evening hours.    He has a history of R-MCA stroke requiring thrombectomy. He also has a history of blood in urine, hish PSA (7.0) with subsequent negative prostate biopsy, though denies any recent episodes.     He takes Losartan-HCTZ 200-12.5mg, Lipitor 40 mg, and Eliquis 5mg BID.    He has family history of cardiovascular disease with father having a pacemaker. Mother had lung cancer and was a heavy smoker.    He denies smoking and reports rare social alcohol consumption.      He has baseline hearing loss and wears hearing aids.     Review of Problems & History:  Problem List[1]   Past Medical History:   Diagnosis Date    Essential hypertension 2/18/2022    Paroxysmal atrial fibrillation 2/18/2022      Past Surgical History:   Procedure Laterality Date    bilateral knee arthroscopy      MASTOIDECTOMY      PROSTATE BIOPSY      THROMBECTOMY        Social History     Socioeconomic History    Marital status:    Tobacco Use    Smoking status: Never    Smokeless tobacco: Never   Substance and Sexual Activity    Alcohol use: Yes     Comment: rare        Medication Review:  Current Medications[2]     Review of Systems   Constitutional:  Positive for fatigue. Negative for chills and fever.   HENT:  Negative for nasal congestion, ear pain, postnasal drip and sore throat.    Eyes:  Negative for visual disturbance.   Respiratory:  Negative for cough, shortness of breath and wheezing.    Cardiovascular:  Negative for chest pain and palpitations.   Gastrointestinal:  Negative for abdominal pain, change in bowel habit, constipation, diarrhea, nausea and vomiting.   Genitourinary:  Negative for  "dysuria and hematuria.   Musculoskeletal:  Negative for back pain, leg pain and neck pain.   Neurological:  Negative for dizziness, numbness and headaches.   Hematological:  Negative for adenopathy.   Psychiatric/Behavioral:  Negative for dysphoric mood, sleep disturbance and suicidal ideas. The patient is not nervous/anxious.           Objective:      Vitals:    02/21/25 1414 02/21/25 1451   BP: 108/66 120/63   BP Location: Right arm    Patient Position: Sitting    Pulse:  75   SpO2:  95%   Weight: 106.7 kg (235 lb 3.7 oz)    Height: 5' 9" (1.753 m)       Physical Exam  Vitals reviewed.   Constitutional:       General: He is not in acute distress.     Appearance: Normal appearance. He is not ill-appearing.   HENT:      Head: Normocephalic and atraumatic.      Right Ear: Tympanic membrane, ear canal and external ear normal. There is no impacted cerumen.      Left Ear: Tympanic membrane, ear canal and external ear normal. There is impacted cerumen.      Nose: Nose normal. No congestion or rhinorrhea.      Mouth/Throat:      Mouth: Mucous membranes are moist.      Pharynx: Oropharynx is clear. No oropharyngeal exudate or posterior oropharyngeal erythema.   Eyes:      General: No scleral icterus.        Right eye: No discharge.         Left eye: No discharge.      Conjunctiva/sclera: Conjunctivae normal.   Neck:      Thyroid: No thyroid mass, thyromegaly or thyroid tenderness.   Cardiovascular:      Rate and Rhythm: Normal rate and regular rhythm.      Pulses: Normal pulses.      Heart sounds: Normal heart sounds. No murmur heard.     No friction rub. No gallop.   Pulmonary:      Effort: Pulmonary effort is normal. No respiratory distress.      Breath sounds: Normal breath sounds. No stridor. No wheezing, rhonchi or rales.   Abdominal:      General: Abdomen is flat. Bowel sounds are normal. There is no distension.      Palpations: Abdomen is soft. There is no mass.      Tenderness: There is no abdominal tenderness. " There is no guarding.      Hernia: No hernia is present.   Musculoskeletal:         General: No swelling or deformity. Normal range of motion.      Cervical back: Normal range of motion and neck supple. No tenderness.   Lymphadenopathy:      Cervical: No cervical adenopathy.   Skin:     General: Skin is warm and dry.   Neurological:      General: No focal deficit present.      Mental Status: He is alert and oriented to person, place, and time. Mental status is at baseline.      Gait: Gait normal.      Deep Tendon Reflexes: Reflexes normal.   Psychiatric:         Mood and Affect: Mood normal.         Behavior: Behavior normal.         Thought Content: Thought content normal.         Judgment: Judgment normal.           Assessment/Plan:             ICD-10-CM ICD-9-CM   1. Encounter for annual general medical examination without abnormal findings in adult  Z00.00 V70.0   2. Encounter to establish care with new doctor  Z76.89 V65.8   3. Stroke due to embolism of right middle cerebral artery  I63.411 434.11   4. Essential hypertension  I10 401.9   5. Paroxysmal atrial fibrillation  I48.0 427.31   6. Elevated PSA  R97.20 790.93   7. WINDY (obstructive sleep apnea)  G47.33 327.23   8. Need for vaccination  Z23 V05.9   9. Encounter for screening for malignant neoplasm of colon  Z12.11 V76.51   10. Encounter for screening for malignant neoplasm of prostate  Z12.5 V76.44   11. Class 1 obesity due to excess calories with serious comorbidity and body mass index (BMI) of 34.0 to 34.9 in adult  E66.811 278.00    E66.09 V85.34    Z68.34    12. Bilateral hearing loss, unspecified hearing loss type  H91.93 389.9     Orders Placed This Encounter   Procedures    Cologuard Screening (Multitarget Stool DNA)    Comprehensive Metabolic Panel    CBC Auto Differential    Lipid Panel    Hemoglobin A1C    TSH    T4, Free    PSA, Screening    Hepatitis C Antibody    HIV 1/2 Ag/Ab (4th Gen)       Assessment & Plan    - Reviewed patient's history,  including recent prostate biopsy and thrombectomy  - Assessed current medication regimen  - Evaluated recent lab results from July  - Considered low blood pressure (128/63) in context of current medications  - Will monitor blood pressure at home for potential medication adjustment  - Opted for non-invasive Cologuard test instead of colonoscopy due to bleeding risk from Eliquis    1. Encounter for annual general medical examination without abnormal findings in adult (Primary)  Health maintenance updated  Chronic issues reviewed  Fasting labs ordered  - Comprehensive Metabolic Panel; Future  - CBC Auto Differential; Future  - Lipid Panel; Future  - Hemoglobin A1C; Future  - TSH; Future  - T4, Free; Future  - Hepatitis C Antibody; Future  - HIV 1/2 Ag/Ab (4th Gen); Future    2. Encounter to establish care with new doctor  Reviewed chronic medical conditions, updated problem list and medication list    3. Stroke due to embolism of right middle cerebral artery  Stable, at baseline  Anticoagulation on-board  Risk mitigation w/ HTN and HLP management  Not currently followed by neurology; not indicated at this time    4. Essential hypertension  Stable and controlled  Continue losartan-HCTZ    5. Paroxysmal atrial fibrillation  Stable, in NSR today  Continue eliquis for anticoagulation    6. Elevated PSA  Noted on hx, f/u PSA  Further work-up with urology as indicated    7. WINDY (obstructive sleep apnea)  Stable, managed with CPAP    8. Need for vaccination  Influenza declined    9. Encounter for screening for malignant neoplasm of colon  Discussed r/b/a for testing modalities; pt wishes to proceed w/ Cologuard  - Cologuard Screening (Multitarget Stool DNA); Future  - Cologuard Screening (Multitarget Stool DNA)    10. Encounter for screening for malignant neoplasm of prostate  - PSA, Screening; Future    11. Class 1 obesity due to excess calories with serious comorbidity and body mass index (BMI) of 34.0 to 34.9 in  adult  Encourage healthy diet and exercise habits to optimize health and minimize chance of comorbidity development    12. Bilateral hearing loss, unspecified hearing loss type  Noted, at baseline w/o acute issues              No follow-ups on file.     Maycol Lock MD, [facility]  Family Medicine Physician  [facility]  02/27/2025    This note was generated with the assistance of ambient listening technology. Verbal consent was obtained by the patient and accompanying visitor(s) for the recording of patient appointment to facilitate this note. I attest to having reviewed and edited the generated note for accuracy, though some syntax or spelling errors may persist. Please contact the author of this note for any clarification.           [1]   Patient Active Problem List  Diagnosis    Stroke due to embolism of right middle cerebral artery    Paroxysmal atrial fibrillation    Essential hypertension    WINDY (obstructive sleep apnea)    Elevated PSA    Class 1 obesity due to excess calories with serious comorbidity and body mass index (BMI) of 34.0 to 34.9 in adult    Bilateral hearing loss   [2]   Current Outpatient Medications:     apixaban (ELIQUIS) 5 mg Tab, Take 1 tablet (5 mg total) by mouth 2 (two) times daily., Disp: 60 tablet, Rfl: 6    atorvastatin (LIPITOR) 40 MG tablet, Take 1 tablet (40 mg total) by mouth once daily., Disp: 90 tablet, Rfl: 3    losartan-hydrochlorothiazide 100-12.5 mg (HYZAAR) 100-12.5 mg Tab, Take 1 tablet by mouth., Disp: , Rfl:

## 2025-03-18 ENCOUNTER — RESULTS FOLLOW-UP (OUTPATIENT)
Dept: INTERNAL MEDICINE | Facility: CLINIC | Age: 60
End: 2025-03-18

## 2025-06-10 ENCOUNTER — LAB VISIT (OUTPATIENT)
Dept: LAB | Facility: HOSPITAL | Age: 60
End: 2025-06-10
Attending: FAMILY MEDICINE
Payer: COMMERCIAL

## 2025-06-10 DIAGNOSIS — Z00.00 ENCOUNTER FOR ANNUAL GENERAL MEDICAL EXAMINATION WITHOUT ABNORMAL FINDINGS IN ADULT: ICD-10-CM

## 2025-06-10 DIAGNOSIS — Z12.5 ENCOUNTER FOR SCREENING FOR MALIGNANT NEOPLASM OF PROSTATE: ICD-10-CM

## 2025-06-10 LAB
ABSOLUTE EOSINOPHIL (OHS): 0.11 K/UL
ABSOLUTE MONOCYTE (OHS): 0.65 K/UL (ref 0.3–1)
ABSOLUTE NEUTROPHIL COUNT (OHS): 4.24 K/UL (ref 1.8–7.7)
ALBUMIN SERPL BCP-MCNC: 4 G/DL (ref 3.5–5.2)
ALP SERPL-CCNC: 49 UNIT/L (ref 40–150)
ALT SERPL W/O P-5'-P-CCNC: 28 UNIT/L (ref 10–44)
ANION GAP (OHS): 8 MMOL/L (ref 8–16)
AST SERPL-CCNC: 23 UNIT/L (ref 11–45)
BASOPHILS # BLD AUTO: 0.02 K/UL
BASOPHILS NFR BLD AUTO: 0.3 %
BILIRUB SERPL-MCNC: 0.8 MG/DL (ref 0.1–1)
BUN SERPL-MCNC: 16 MG/DL (ref 6–20)
CALCIUM SERPL-MCNC: 9.2 MG/DL (ref 8.7–10.5)
CHLORIDE SERPL-SCNC: 105 MMOL/L (ref 95–110)
CHOLEST SERPL-MCNC: 118 MG/DL (ref 120–199)
CHOLEST/HDLC SERPL: 2.7 {RATIO} (ref 2–5)
CO2 SERPL-SCNC: 27 MMOL/L (ref 23–29)
CREAT SERPL-MCNC: 0.9 MG/DL (ref 0.5–1.4)
EAG (OHS): 94 MG/DL (ref 68–131)
ERYTHROCYTE [DISTWIDTH] IN BLOOD BY AUTOMATED COUNT: 13.1 % (ref 11.5–14.5)
GFR SERPLBLD CREATININE-BSD FMLA CKD-EPI: >60 ML/MIN/1.73/M2
GLUCOSE SERPL-MCNC: 94 MG/DL (ref 70–110)
HBA1C MFR BLD: 4.9 % (ref 4–5.6)
HCT VFR BLD AUTO: 49.6 % (ref 40–54)
HCV AB SERPL QL IA: NORMAL
HDLC SERPL-MCNC: 43 MG/DL (ref 40–75)
HDLC SERPL: 36.4 % (ref 20–50)
HGB BLD-MCNC: 17 GM/DL (ref 14–18)
HIV 1+2 AB+HIV1 P24 AG SERPL QL IA: NORMAL
IMM GRANULOCYTES # BLD AUTO: 0.03 K/UL (ref 0–0.04)
IMM GRANULOCYTES NFR BLD AUTO: 0.5 % (ref 0–0.5)
LDLC SERPL CALC-MCNC: 64.2 MG/DL (ref 63–159)
LYMPHOCYTES # BLD AUTO: 1.61 K/UL (ref 1–4.8)
MCH RBC QN AUTO: 29.7 PG (ref 27–31)
MCHC RBC AUTO-ENTMCNC: 34.3 G/DL (ref 32–36)
MCV RBC AUTO: 87 FL (ref 82–98)
NONHDLC SERPL-MCNC: 75 MG/DL
NUCLEATED RBC (/100WBC) (OHS): 0 /100 WBC
PLATELET # BLD AUTO: 180 K/UL (ref 150–450)
PMV BLD AUTO: 9.6 FL (ref 9.2–12.9)
POTASSIUM SERPL-SCNC: 4.4 MMOL/L (ref 3.5–5.1)
PROT SERPL-MCNC: 7 GM/DL (ref 6–8.4)
PSA SERPL-MCNC: 4.15 NG/ML
RBC # BLD AUTO: 5.72 M/UL (ref 4.6–6.2)
RELATIVE EOSINOPHIL (OHS): 1.7 %
RELATIVE LYMPHOCYTE (OHS): 24.2 % (ref 18–48)
RELATIVE MONOCYTE (OHS): 9.8 % (ref 4–15)
RELATIVE NEUTROPHIL (OHS): 63.5 % (ref 38–73)
SODIUM SERPL-SCNC: 140 MMOL/L (ref 136–145)
T4 FREE SERPL-MCNC: 1.05 NG/DL (ref 0.71–1.51)
TRIGL SERPL-MCNC: 54 MG/DL (ref 30–150)
TSH SERPL-ACNC: 2.08 UIU/ML (ref 0.4–4)
WBC # BLD AUTO: 6.66 K/UL (ref 3.9–12.7)

## 2025-06-10 PROCEDURE — 84443 ASSAY THYROID STIM HORMONE: CPT

## 2025-06-10 PROCEDURE — 84439 ASSAY OF FREE THYROXINE: CPT

## 2025-06-10 PROCEDURE — 85025 COMPLETE CBC W/AUTO DIFF WBC: CPT

## 2025-06-10 PROCEDURE — 86803 HEPATITIS C AB TEST: CPT

## 2025-06-10 PROCEDURE — 83036 HEMOGLOBIN GLYCOSYLATED A1C: CPT

## 2025-06-10 PROCEDURE — 82040 ASSAY OF SERUM ALBUMIN: CPT

## 2025-06-10 PROCEDURE — 36415 COLL VENOUS BLD VENIPUNCTURE: CPT

## 2025-06-10 PROCEDURE — 84153 ASSAY OF PSA TOTAL: CPT

## 2025-06-10 PROCEDURE — 80061 LIPID PANEL: CPT

## 2025-06-10 PROCEDURE — 87389 HIV-1 AG W/HIV-1&-2 AB AG IA: CPT

## 2025-06-30 ENCOUNTER — TELEPHONE (OUTPATIENT)
Dept: INTERNAL MEDICINE | Facility: CLINIC | Age: 60
End: 2025-06-30
Payer: COMMERCIAL

## 2025-06-30 NOTE — TELEPHONE ENCOUNTER
Pt wife called to request a script of shampoo for hair due to dry patched. It a new request, as I did not see the requested med on pt list. Pt wants the medication to be filled at [facility] on [address] at [place]. Please advise. Thanks.

## 2025-06-30 NOTE — TELEPHONE ENCOUNTER
Copied from CRM #[id number]. Topic: General Inquiry - Patient Advice  >> Jun 30, 2025  3:36 PM Chantel wrote:  .1MEDICALADVICE   alling due to dry patches in the heaPatient is calling for Medical Advice regarding:Good Afternoon, Patient's spouse is cd. Patient is itchy and need a Rx Shampoo    How long has patient had these symptoms:    Pharmacy name and phone#:  [id number] - [place], [place] - [address] [facility] [address] [facility]  [address] [place] [address]  Phone: [phone] Fax: [phone]        Patient wants a call back or thru [facility], provide patient's call back phone number:call     Comments:    Please advise patient replies from provider may take up to 48 hours.  >> Jun 30, 2025  3:50 PM Med Assistant Riddhi wrote:  >> Jun 30, 2025  3:49 PM Med Assistant Riddhi wrote:  >> Jun 30, 2025  3:49 PM Med Assistant Riddhi wrote:  >> Jun 30, 2025  3:48 PM Med Assistant Riddhi wrote:

## 2025-07-02 ENCOUNTER — PATIENT MESSAGE (OUTPATIENT)
Dept: INTERNAL MEDICINE | Facility: CLINIC | Age: 60
End: 2025-07-02
Payer: COMMERCIAL

## 2025-08-06 ENCOUNTER — PATIENT MESSAGE (OUTPATIENT)
Dept: INTERNAL MEDICINE | Facility: CLINIC | Age: 60
End: 2025-08-06
Payer: COMMERCIAL

## 2025-08-12 ENCOUNTER — OFFICE VISIT (OUTPATIENT)
Dept: INTERNAL MEDICINE | Facility: CLINIC | Age: 60
End: 2025-08-12
Payer: COMMERCIAL

## 2025-08-12 DIAGNOSIS — H91.93 BILATERAL HEARING LOSS, UNSPECIFIED HEARING LOSS TYPE: ICD-10-CM

## 2025-08-12 DIAGNOSIS — R97.20 ELEVATED PSA: ICD-10-CM

## 2025-08-12 DIAGNOSIS — R53.83 FATIGUE, UNSPECIFIED TYPE: Primary | ICD-10-CM

## 2025-08-12 DIAGNOSIS — Z71.2 PERSON CONSULTING FOR EXPLANATION OF EXAMINATION OR TEST FINDING: ICD-10-CM

## 2025-08-12 PROCEDURE — 98005 SYNCH AUDIO-VIDEO EST LOW 20: CPT | Mod: 95,,, | Performed by: FAMILY MEDICINE

## 2025-08-12 PROCEDURE — 3044F HG A1C LEVEL LT 7.0%: CPT | Mod: CPTII,95,, | Performed by: FAMILY MEDICINE

## 2025-08-12 PROCEDURE — 1160F RVW MEDS BY RX/DR IN RCRD: CPT | Mod: CPTII,95,, | Performed by: FAMILY MEDICINE

## 2025-08-12 PROCEDURE — 1159F MED LIST DOCD IN RCRD: CPT | Mod: CPTII,95,, | Performed by: FAMILY MEDICINE

## 2025-08-15 ENCOUNTER — TELEPHONE (OUTPATIENT)
Dept: OTOLARYNGOLOGY | Facility: CLINIC | Age: 60
End: 2025-08-15
Payer: COMMERCIAL

## 2025-08-22 ENCOUNTER — LAB VISIT (OUTPATIENT)
Dept: LAB | Facility: HOSPITAL | Age: 60
End: 2025-08-22
Attending: FAMILY MEDICINE
Payer: COMMERCIAL

## 2025-08-22 DIAGNOSIS — R97.20 ELEVATED PSA: ICD-10-CM

## 2025-08-22 DIAGNOSIS — R53.83 FATIGUE, UNSPECIFIED TYPE: ICD-10-CM

## 2025-08-22 LAB
PSA SERPL-MCNC: 6.25 NG/ML
TESTOST SERPL-MCNC: 451 NG/DL (ref 304–1227)

## 2025-08-22 PROCEDURE — 84153 ASSAY OF PSA TOTAL: CPT

## 2025-08-22 PROCEDURE — 36415 COLL VENOUS BLD VENIPUNCTURE: CPT

## 2025-08-22 PROCEDURE — 84403 ASSAY OF TOTAL TESTOSTERONE: CPT

## 2025-08-29 RX ORDER — ATORVASTATIN CALCIUM 40 MG/1
40 TABLET, FILM COATED ORAL DAILY
Qty: 90 TABLET | Refills: 3 | Status: SHIPPED | OUTPATIENT
Start: 2025-08-29 | End: 2026-08-29

## 2025-09-02 ENCOUNTER — OFFICE VISIT (OUTPATIENT)
Dept: OTOLARYNGOLOGY | Facility: CLINIC | Age: 60
End: 2025-09-02
Payer: COMMERCIAL

## 2025-09-02 ENCOUNTER — CLINICAL SUPPORT (OUTPATIENT)
Dept: AUDIOLOGY | Facility: CLINIC | Age: 60
End: 2025-09-02
Payer: COMMERCIAL

## 2025-09-02 DIAGNOSIS — Z97.4 WEARS HEARING AID IN BOTH EARS: ICD-10-CM

## 2025-09-02 DIAGNOSIS — H61.23 BILATERAL IMPACTED CERUMEN: Primary | ICD-10-CM

## 2025-09-02 DIAGNOSIS — H90.3 SENSORINEURAL HEARING LOSS (SNHL) OF BOTH EARS: ICD-10-CM

## 2025-09-02 DIAGNOSIS — Z90.89 HISTORY OF LEFT MASTOIDECTOMY: ICD-10-CM

## 2025-09-02 DIAGNOSIS — H90.A22 SENSORINEURAL HEARING LOSS (SNHL) OF LEFT EAR WITH RESTRICTED HEARING OF RIGHT EAR: Primary | ICD-10-CM

## 2025-09-02 PROCEDURE — 1159F MED LIST DOCD IN RCRD: CPT | Mod: CPTII,S$GLB,, | Performed by: NURSE PRACTITIONER

## 2025-09-02 PROCEDURE — 92557 COMPREHENSIVE HEARING TEST: CPT | Mod: S$GLB,,, | Performed by: AUDIOLOGIST

## 2025-09-02 PROCEDURE — 99999 PR PBB SHADOW E&M-EST. PATIENT-LVL II: CPT | Mod: PBBFAC,,, | Performed by: AUDIOLOGIST

## 2025-09-02 PROCEDURE — 92567 TYMPANOMETRY: CPT | Mod: S$GLB,,, | Performed by: AUDIOLOGIST

## 2025-09-02 PROCEDURE — 99204 OFFICE O/P NEW MOD 45 MIN: CPT | Mod: 25,S$GLB,, | Performed by: NURSE PRACTITIONER

## 2025-09-02 PROCEDURE — 69210 REMOVE IMPACTED EAR WAX UNI: CPT | Mod: 50,S$GLB,, | Performed by: NURSE PRACTITIONER

## 2025-09-02 PROCEDURE — 99999 PR PBB SHADOW E&M-EST. PATIENT-LVL II: CPT | Mod: PBBFAC,,, | Performed by: NURSE PRACTITIONER

## 2025-09-02 PROCEDURE — 3044F HG A1C LEVEL LT 7.0%: CPT | Mod: CPTII,S$GLB,, | Performed by: NURSE PRACTITIONER
